# Patient Record
Sex: FEMALE | Race: WHITE | NOT HISPANIC OR LATINO | Employment: FULL TIME | URBAN - METROPOLITAN AREA
[De-identification: names, ages, dates, MRNs, and addresses within clinical notes are randomized per-mention and may not be internally consistent; named-entity substitution may affect disease eponyms.]

---

## 2019-03-22 ENCOUNTER — COMMUNICATION - HEALTHEAST (OUTPATIENT)
Dept: FAMILY MEDICINE | Facility: CLINIC | Age: 24
End: 2019-03-22

## 2021-05-26 NOTE — TELEPHONE ENCOUNTER
Spoke with patient asking if she has established care with a new provider since Dr. Diaz has retired. Informed patient that we three providers here on [address] If she would like to establish care with (Dr. Peña, Dr. Narvaez, Lelia Farah CNP).    Patient stated she has moved to [place], [place] and has established care with a new provider Dr. Sanam Mckeon at [facility].     ADRI/JACKSON

## 2021-08-15 ENCOUNTER — HEALTH MAINTENANCE LETTER (OUTPATIENT)
Age: 26
End: 2021-08-15

## 2021-10-11 ENCOUNTER — HEALTH MAINTENANCE LETTER (OUTPATIENT)
Age: 26
End: 2021-10-11

## 2022-02-07 ASSESSMENT — ANXIETY QUESTIONNAIRES
GAD7 TOTAL SCORE: 1
6. BECOMING EASILY ANNOYED OR IRRITABLE: NOT AT ALL
1. FEELING NERVOUS, ANXIOUS, OR ON EDGE: SEVERAL DAYS
3. WORRYING TOO MUCH ABOUT DIFFERENT THINGS: NOT AT ALL
5. BEING SO RESTLESS THAT IT IS HARD TO SIT STILL: NOT AT ALL
7. FEELING AFRAID AS IF SOMETHING AWFUL MIGHT HAPPEN: NOT AT ALL
GAD7 TOTAL SCORE: 1
4. TROUBLE RELAXING: NOT AT ALL
2. NOT BEING ABLE TO STOP OR CONTROL WORRYING: NOT AT ALL
7. FEELING AFRAID AS IF SOMETHING AWFUL MIGHT HAPPEN: NOT AT ALL

## 2022-02-08 ASSESSMENT — ANXIETY QUESTIONNAIRES: GAD7 TOTAL SCORE: 1

## 2022-02-14 ENCOUNTER — OFFICE VISIT (OUTPATIENT)
Dept: OBGYN | Facility: CLINIC | Age: 27
End: 2022-02-14
Attending: ADVANCED PRACTICE MIDWIFE
Payer: COMMERCIAL

## 2022-02-14 VITALS
BODY MASS INDEX: 27.38 KG/M2 | WEIGHT: 164.3 LBS | SYSTOLIC BLOOD PRESSURE: 132 MMHG | HEART RATE: 91 BPM | HEIGHT: 65 IN | DIASTOLIC BLOOD PRESSURE: 85 MMHG

## 2022-02-14 DIAGNOSIS — Z12.4 SCREENING FOR MALIGNANT NEOPLASM OF CERVIX: ICD-10-CM

## 2022-02-14 DIAGNOSIS — Z00.00 VISIT FOR PREVENTIVE HEALTH EXAMINATION: Primary | ICD-10-CM

## 2022-02-14 DIAGNOSIS — Z13.1 SCREENING FOR DIABETES MELLITUS: ICD-10-CM

## 2022-02-14 DIAGNOSIS — Z30.431 INTRAUTERINE DEVICE SURVEILLANCE: ICD-10-CM

## 2022-02-14 DIAGNOSIS — T83.32XA INTRAUTERINE CONTRACEPTIVE DEVICE THREADS LOST, INITIAL ENCOUNTER: ICD-10-CM

## 2022-02-14 DIAGNOSIS — Z01.419 ENCOUNTER FOR GYNECOLOGICAL EXAMINATION WITHOUT ABNORMAL FINDING: ICD-10-CM

## 2022-02-14 LAB
ALBUMIN SERPL-MCNC: 3.8 G/DL (ref 3.4–5)
ALP SERPL-CCNC: 44 U/L (ref 40–150)
ALT SERPL W P-5'-P-CCNC: 24 U/L (ref 0–50)
ANION GAP SERPL CALCULATED.3IONS-SCNC: 4 MMOL/L (ref 3–14)
AST SERPL W P-5'-P-CCNC: 9 U/L (ref 0–45)
BILIRUB SERPL-MCNC: 0.4 MG/DL (ref 0.2–1.3)
BUN SERPL-MCNC: 15 MG/DL (ref 7–30)
CALCIUM SERPL-MCNC: 9.3 MG/DL (ref 8.5–10.1)
CHLORIDE BLD-SCNC: 106 MMOL/L (ref 94–109)
CO2 SERPL-SCNC: 25 MMOL/L (ref 20–32)
CREAT SERPL-MCNC: 0.58 MG/DL (ref 0.52–1.04)
ERYTHROCYTE [DISTWIDTH] IN BLOOD BY AUTOMATED COUNT: 12.2 % (ref 10–15)
GFR SERPL CREATININE-BSD FRML MDRD: >90 ML/MIN/1.73M2
GLUCOSE BLD-MCNC: 97 MG/DL (ref 70–99)
HBA1C MFR BLD: 5.1 % (ref 0–5.6)
HCT VFR BLD AUTO: 40 % (ref 35–47)
HGB BLD-MCNC: 13.6 G/DL (ref 11.7–15.7)
MCH RBC QN AUTO: 30 PG (ref 26.5–33)
MCHC RBC AUTO-ENTMCNC: 34 G/DL (ref 31.5–36.5)
MCV RBC AUTO: 88 FL (ref 78–100)
PLATELET # BLD AUTO: 246 10E3/UL (ref 150–450)
POTASSIUM BLD-SCNC: 3.9 MMOL/L (ref 3.4–5.3)
PROT SERPL-MCNC: 7.6 G/DL (ref 6.8–8.8)
RBC # BLD AUTO: 4.53 10E6/UL (ref 3.8–5.2)
SODIUM SERPL-SCNC: 135 MMOL/L (ref 133–144)
WBC # BLD AUTO: 5.8 10E3/UL (ref 4–11)

## 2022-02-14 PROCEDURE — G0463 HOSPITAL OUTPT CLINIC VISIT: HCPCS

## 2022-02-14 PROCEDURE — 85027 COMPLETE CBC AUTOMATED: CPT | Performed by: ADVANCED PRACTICE MIDWIFE

## 2022-02-14 PROCEDURE — G0145 SCR C/V CYTO,THINLAYER,RESCR: HCPCS | Performed by: ADVANCED PRACTICE MIDWIFE

## 2022-02-14 PROCEDURE — 99385 PREV VISIT NEW AGE 18-39: CPT | Performed by: ADVANCED PRACTICE MIDWIFE

## 2022-02-14 PROCEDURE — 83036 HEMOGLOBIN GLYCOSYLATED A1C: CPT | Performed by: ADVANCED PRACTICE MIDWIFE

## 2022-02-14 PROCEDURE — 80053 COMPREHEN METABOLIC PANEL: CPT | Performed by: ADVANCED PRACTICE MIDWIFE

## 2022-02-14 PROCEDURE — 36415 COLL VENOUS BLD VENIPUNCTURE: CPT | Performed by: ADVANCED PRACTICE MIDWIFE

## 2022-02-14 ASSESSMENT — ENCOUNTER SYMPTOMS
NECK MASS: 0
HEMATURIA: 0
LIGHT-HEADEDNESS: 0
DEPRESSION: 0
SINUS PAIN: 0
HEADACHES: 0
HALLUCINATIONS: 0
BLOOD IN STOOL: 0
SEIZURES: 0
BACK PAIN: 0
EYE WATERING: 0
SMELL DISTURBANCE: 0
LEG SWELLING: 0
EXERCISE INTOLERANCE: 0
HEARTBURN: 0
LOSS OF CONSCIOUSNESS: 0
LEG PAIN: 0
WEIGHT GAIN: 0
SPEECH CHANGE: 0
WHEEZING: 0
WEAKNESS: 0
NIGHT SWEATS: 0
COUGH DISTURBING SLEEP: 0
MEMORY LOSS: 0
POSTURAL DYSPNEA: 0
JOINT SWELLING: 0
SINUS CONGESTION: 0
NAIL CHANGES: 0
COUGH: 0
BOWEL INCONTINENCE: 0
ABDOMINAL PAIN: 0
BREAST MASS: 0
ALTERED TEMPERATURE REGULATION: 0
EYE PAIN: 0
DIARRHEA: 0
FATIGUE: 0
INSOMNIA: 0
BLOATING: 0
PARALYSIS: 0
TROUBLE SWALLOWING: 0
SHORTNESS OF BREATH: 0
POLYDIPSIA: 0
CONSTIPATION: 0
SORE THROAT: 0
NERVOUS/ANXIOUS: 0
RECTAL BLEEDING: 0
MUSCLE WEAKNESS: 0
TINGLING: 0
SLEEP DISTURBANCES DUE TO BREATHING: 0
VOMITING: 0
MUSCLE CRAMPS: 0
BRUISES/BLEEDS EASILY: 0
DYSURIA: 0
TREMORS: 0
SNORES LOUDLY: 0
SPUTUM PRODUCTION: 0
HOARSE VOICE: 0
FEVER: 0
RESPIRATORY PAIN: 0
HYPERTENSION: 0
HEMOPTYSIS: 0
HYPOTENSION: 0
DIZZINESS: 0
DIFFICULTY URINATING: 0
ORTHOPNEA: 0
EYE IRRITATION: 0
DECREASED APPETITE: 0
SYNCOPE: 0
RECTAL PAIN: 0
HOT FLASHES: 0
MYALGIAS: 0
INCREASED ENERGY: 0
TASTE DISTURBANCE: 0
DISTURBANCES IN COORDINATION: 0
POLYPHAGIA: 0
NUMBNESS: 0
DYSPNEA ON EXERTION: 0
ARTHRALGIAS: 0
EXTREMITY NUMBNESS: 0
WEIGHT LOSS: 0
NECK PAIN: 0
DECREASED LIBIDO: 0
DECREASED CONCENTRATION: 0
JAUNDICE: 0
DOUBLE VISION: 0
POOR WOUND HEALING: 0
PANIC: 0
FLANK PAIN: 0
NAUSEA: 0
STIFFNESS: 0
BREAST PAIN: 0
TACHYCARDIA: 0
EYE REDNESS: 0
SKIN CHANGES: 0
SWOLLEN GLANDS: 0
CHILLS: 0
CLAUDICATION: 0
PALPITATIONS: 0

## 2022-02-14 ASSESSMENT — PAIN SCALES - GENERAL: PAINLEVEL: NO PAIN (0)

## 2022-02-14 ASSESSMENT — MIFFLIN-ST. JEOR: SCORE: 1476.14

## 2022-02-14 NOTE — PROGRESS NOTES
"  Progress Note    SUBJECTIVE:  Vreo Honeycutt is an 26 year old, No obstetric history on file., who requests an Annual Preventive Exam.       Concerns today include: Overall feeling well, no present concerns.     Menstrual History:  Has a IUD (Mirena) placed in 2019, for birth control  Performs string tests \"not often\"  **has not felt strings x 4 mos, prior to that could routinely feel them. Denies pain.  Hx of malpositioned IUD (2nd IUD), felt pain with intercourse, IUD found in cx canal, replaced w current IUD in 2019, no pain since  One male partner, monogamous relationship over last 5 years  Gets a couple days of spotting every few months    No results found for: PAP, last PAP was in [place] about 3 years ago  History of abnormal Pap smear: NO - age 21-29 PAP every 3 years recommended    Last No results found for: HPV16  Last No results found for: HPV18  Last No results found for: HRHPV    Mammogram current: mother had breast cancer at age 35. Discussed early mammograms as an option in the future with shared decision making.    Last Colonoscopy: No risk factors and no screening until 45 per new guidelines.     HISTORY:  calcium carbonate (OS-JUVENTINO 500 MG [place]. [place]) 500 MG tablet, Take 500 mg by mouth 2 times daily.    magnesium 250 MG tablet, Take 1 tablet by mouth daily.    Probiotic Product (SOLUBLE FIBER/PROBIOTICS) CHEW, Take  by mouth.    levonorgestrel (MIRENA) 20 MCG/24HR IUD, 1 each by Intrauterine route    No current facility-administered medications on file prior to visit.    No Known Allergies  Immunization History   Administered Date(s) Administered     COVID-19,PF,Pfizer (12+ Yrs) 2021     MMR 2015     Meningococcal (Menomune ) 10/14/2012     Td,adult,historic,unspecified 2005, 08/15/2011, 2011     Tdap (Adacel,Boostrix) 2016       OB History    Para Term  AB Living   0 0 0 0 0 0   SAB IAB Ectopic Multiple Live Births   0 0 0 0 0     Past Medical History: "   Diagnosis Date     Urinary tract infection I had about three in 2018, but the issue resolved     Past Surgical History:   Procedure Laterality Date     HC REDUCTION OF LARGE BREAST      Description: Breast Surgery Reduction Procedure;  Recorded: 06/16/2014;     MAMMOPLASTY REDUCTION BILATERAL       ORTHOPEDIC SURGERY  ACL reconstruction March 2016     ID HYMENOTOMY, SIMPLE INCISION      Description: Hymenotomy;  Recorded: 06/06/2013;     WISDOM TOOTH EXTRACTION       Family History   Problem Relation Age of Onset     Breast Cancer Mother      Osteoporosis Mother         She has osteopenia     Alcoholism Father      Anxiety Disorder Father      Depression Father      Breast Cancer Maternal Grandmother      Other Cancer Maternal Grandmother         Pancreatic     Cancer Maternal Grandfather         testicular     Prostate Cancer Maternal Grandfather      Breast Cancer Cousin      Asthma Niece      Social History     Socioeconomic History     Marital status: Single     Spouse name: Not on file     Number of children: Not on file     Years of education: Not on file     Highest education level: Not on file   Occupational History     Not on file   Tobacco Use     Smoking status: Never Smoker     Smokeless tobacco: Never Used   Substance and Sexual Activity     Alcohol use: Yes     Alcohol/week: 1.7 standard drinks     Drug use: No     Sexual activity: Yes     Partners: Male     Birth control/protection: I.U.D.   Other Topics Concern     Parent/sibling w/ CABG, MI or angioplasty before 65F 55M? 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   HENT:  Negative for ear pain, hearing loss, tinnitus, nosebleeds, trouble swallowing, hoarse voice, mouth sores, sore throat, ear discharge, tooth pain, gum tenderness, taste disturbance, smell disturbance, hearing aid, bleeding gums, dry mouth, sinus pain, sinus congestion and neck mass.    Eyes:  Negative for double vision, pain, redness, eye pain, decreased vision, eye watering, eye bulging, eye dryness, flashing lights, spots, floaters, strabismus, tunnel vision, jaundice and eye irritation.   Respiratory:   Negative for cough, hemoptysis, sputum production, shortness of breath, wheezing, sleep disturbances due to breathing, snores loudly, respiratory pain, dyspnea on exertion, cough disturbing sleep and postural dyspnea.    Cardiovascular:  Negative for chest pain, dyspnea on exertion, palpitations, orthopnea, claudication, leg swelling, fingers/toes turn blue, hypertension, hypotension, syncope, history of heart murmur, chest pain on exertion, chest pain at rest, pacemaker, few scattered varicosities, leg pain, sleep disturbances due to breathing, tachycardia, light-headedness, exercise intolerance and edema.   Gastrointestinal:  Negative for heartburn, nausea, vomiting, abdominal  pain, diarrhea, constipation, blood in stool, melena, rectal pain, bloating, hemorrhoids, bowel incontinence, jaundice, rectal bleeding, coffee ground emesis and change in stool.   Genitourinary:  Negative for bladder incontinence, dysuria, urgency, hematuria, flank pain, vaginal discharge, difficulty urinating, genital sores, dyspareunia, decreased libido, nocturia, voiding less frequently, arousal difficulty, abnormal vaginal bleeding, excessive menstruation, menstrual changes, hot flashes, vaginal dryness and postmenopausal bleeding.   Musculoskeletal:  Negative for myalgias, back pain, joint swelling, arthralgias, stiffness, muscle cramps, neck pain, bone pain, muscle weakness and fracture.   Skin:  Negative for nail changes, itching, poor wound healing, rash, hair changes, skin changes, acne, warts, poor wound healing, scarring, flaky skin, Raynaud's phenomenon, sensitivity to sunlight and skin thickening.   Neurological:  Negative for dizziness, tingling, tremors, speech change, seizures, loss of consciousness, weakness, light-headedness, numbness, headaches, disturbances in coordination, extremity numbness, memory loss, difficulty walking and paralysis.   Endo/Heme:  Negative for anemia, swollen glands and bruises/bleeds easily.   Psychiatric/Behavioral:  Negative for depression, hallucinations, memory loss, decreased concentration, mood swings and panic attacks.    Breast:  Negative for breast discharge, breast mass, breast pain and nipple retraction.   Endocrine:  Negative for altered temperature regulation, polyphagia, polydipsia, unwanted hair growth and change in facial hair.    Review Of Systems  Skin: negative for, rash, itching, bruising  Eyes: negative for, visual blurring, double vision, eye pain  Ears/Nose/Throat: negative for, nasal congestion, vertigo  Respiratory: No shortness of breath, dyspnea on exertion, cough, or hemoptysis  Cardiovascular: negative, palpitations and chest  "pain  Gastrointestinal: positive for constipation  Genitourinary: negative for, dysuria and hematuria  Musculoskeletal: negative for, back pain and joint pain  Neurologic: negative for and headaches  Psychiatric: negative for, anxiety and depression  Hematologic/Lymphatic/Immunologic: negative for, chills and fever  Endocrine: negative for, cold intolerance, heat intolerance and hot flashes    No flowsheet data found.  KRISTIN-7 SCORE 2/7/2022   Total Score 1 (minimal anxiety)   Total Score 1     PHQ-9 at today's visit was 2 (minimal depression) no intervention needed at this time.    EXAM:  Blood pressure 132/85, pulse 91, height 1.651 m (5' 5\"), weight 74.5 kg (164 lb 4.8 oz). Body mass index is 27.34 kg/m .  General - pleasant female in no acute distress.  Skin - no suspicious lesions or rashes, bilateral well healed scars on breasts.  EENT-  EOMI bilaterally, euthyroid with out palpable nodules  Neck - supple without lymphadenopathy, no thyroid masses to palpation  Lungs - clear to auscultation bilaterally in all posterior lung fileds.  Heart - S1, S2 with regular rate and rhythm without murmur, no murmurs, gallops or rubs. No peripheral edema.   Abdomen - soft, nontender, nondistended, no masses or organomegaly noted.  Musculoskeletal - no gross deformities.  Neurological - normal strength, sensation, and mental status, gait steady.  Mood: Upbeat and engaged.     Breast Exam:  Breast: Without visible skin changes. No dimpling or lesions seen.   Breasts supple, non-tender with palpation, no dominant mass, nodularity, or nipple discharge noted bilaterally. Axillary nodes negative.    bilateral well healed scars on breasts from reduction at age 14.    Pelvic Exam:  EG/BUS: Normal genital architecture without lesions, erythema or abnormal secretions. Normal genital architecture without lesions, erythema or abnormal secretions  Urethral meatus: normal without lesions  Urethra: no masses, tenderness, or " scarring  Bladder: no masses or tenderness   Vagina: moist, pink, rugae with no visible secretions  Cervix: Nulliparous, and pink, moist, closed, without lesion. No palpable IUD, no visible IUD strings.  Uterus: No masses or cervical motion tenderness  Adnexa: Within normal limits and No masses, nodularity, tenderness      ASSESSMENT:  Encounter Diagnoses   Name Primary?     Visit for preventive health examination Yes     Screening for diabetes mellitus      Screening for malignant neoplasm of cervix      Encounter for gynecological examination without abnormal finding      Intrauterine device surveillance      Intrauterine contraceptive device threads lost, initial encounter         PLAN:   Orders Placed This Encounter   Procedures     Pelvic and Breast Exam Procedure []     Pap Smear Exam [] Do Not Remove     US Pelvic Transabdominal and Transvaginal     Hemoglobin A1c [LAB90]     Comprehensive metabolic panel     CBC with Platelets     Orders Placed This Encounter   Medications     levonorgestrel (MIRENA) 20 MCG/24HR IUD     Si each by Intrauterine route     1. Visit for preventive health examination  No results of any recent blood work on file due to her move from [place]. Ordered basic lab work including a CBC, CMP, and hemoglobin A1c for baseline screening. Unable to give a fasting lipid panel today due to having already ate, suggested doing a lipid panel screening at her next visit. Discussed diet, exercise, and sleep habits. Suggested she start taking a vitamin D supplement through the fall and winter months in [place] up to 5,000iu daily.   - Comprehensive metabolic panel; Future  - CBC with Platelets; Future      2. Need for prophylactic vaccination and inoculation against influenza  Needs several immunizations including Hep A, influenza, Hep B, and varicella. Did not get all vaccines when she was younger. Recommended her to get her 2 dose series of Hep A, which we do not offer in clinic and to  check with local pharmacy. The clinic does not have the Influenza vaccine today, directed her to contact her pharmacy to obtain the flu and Hep A. She will consider getting the Hep B vaccine series at a later date.      3. Screening for diabetes mellitus  Not a high risk for diabetes, but no hemoglobin A1c on file. Screen today and educated on nutrition including eating a variety of foods from different food groups and continuing with exercise routine.   - Hemoglobin A1c [LAB90]; Future    4. Screening for malignant neoplasm of cervix  Pap completed in 2019. Per guidelines and patient request pap completed today to screen for cervical cancer. Patient currently uses the IUD as birth control. Discussed using barrier methods for STI protection. In monogamous relationship with one partner over the last 5 years.   - Pap Smear Exam [] Do Not Remove  - Pelvic and Breast Exam Procedure []  - Pap imaged thin layer screen reflex to HPV if ASCUS - recommended age 25 - 29 years    5. Encounter for gynecological examination without abnormal finding  Pap completed, next pap recommendations per ACOG guidelines in 3 years if results are normal.   - Pap Smear Exam [] Do Not Remove  - Pelvic and Breast Exam Procedure []    6. Intrauterine device surveillance  No strings seen during pelvic exam. Ordered US to confirm placement. She would like to contact her insurance company to make sure the US is covered. Will schedule US and come back to complete at a later date.   - US Pelvic Transabdominal and Transvaginal; Future    7. Intrauterine contraceptive device threads lost, initial encounter  Discussed importance of string checks. Will come back for US to confirm placement.   - US Pelvic Transabdominal and Transvaginal; Future    Additional teaching done at this visit regarding self breast exam, exercise, vitamin D supplementation, and birth control.    Return to clinic in one year.  Follow-up as needed.      As a  student nurse practitioner, I participated in the documented services with direct supervision from the provider below.   HAMMAD EspinalN, RN, DNP student  I agree with the PFSH and ROS as completed by the student, except for changes made by me. The remainder of the encounter was performed by me and scribed by the student. The scribed note accurately reflects my personal services and decisions made by me.  Mabel Dumont, JUSTINE, CNM, APRN

## 2022-02-14 NOTE — LETTER
"2/14/2022       RE: Vero Honeycutt  [address] [facility] [address]     Dear Colleague,    Thank you for referring your patient, Vero Honeycutt, to the [facility] [place] at [facility]. Please see a copy of my visit note below.      Progress Note    SUBJECTIVE:  Vero Honeycutt is an 26 year old, No obstetric history on file., who requests an Annual Preventive Exam.       Concerns today include: Overall feeling well, no present concerns.     Menstrual History:  Has a IUD (Mirena) placed in 12/2019, for birth control  Performs string tests \"not often\"  **has not felt strings x 4 mos, prior to that could routinely feel them. Denies pain.  Hx of malpositioned IUD (2nd IUD), felt pain with intercourse, IUD found in cx canal, replaced w current IUD in 2019, no pain since  One male partner, monogamous relationship over last 5 years  Gets a couple days of spotting every few months    No results found for: PAP, last PAP was in [place] about 3 years ago  History of abnormal Pap smear: NO - age 21-29 PAP every 3 years recommended    Last No results found for: HPV16  Last No results found for: HPV18  Last No results found for: HRHPV    Mammogram current: mother had breast cancer at age 35. Discussed early mammograms as an option in the future with shared decision making.    Last Colonoscopy: No risk factors and no screening until 45 per new guidelines.     HISTORY:  calcium carbonate (OS-JUVENTINO 500 MG [place]. [place]) 500 MG tablet, Take 500 mg by mouth 2 times daily.    magnesium 250 MG tablet, Take 1 tablet by mouth daily.    Probiotic Product (SOLUBLE FIBER/PROBIOTICS) CHEW, Take  by mouth.    levonorgestrel (MIRENA) 20 MCG/24HR IUD, 1 each by Intrauterine route    No current facility-administered medications on file prior to visit.    No Known Allergies  Immunization History   Administered Date(s) Administered     COVID-19MIK,Pfizer (12+ Yrs) 12/02/2021 "     MMR 2015     Meningococcal (Menomune ) 10/14/2012     Td,adult,historic,unspecified 2005, 08/15/2011, 2011     Tdap (Adacel,Boostrix) 2016       OB History    Para Term  AB Living   0 0 0 0 0 0   SAB IAB Ectopic Multiple Live Births   0 0 0 0 0     Past Medical History:   Diagnosis Date     Urinary tract infection I had about three in 2018, but the issue resolved     Past Surgical History:   Procedure Laterality Date     HC REDUCTION OF LARGE BREAST      Description: Breast Surgery Reduction Procedure;  Recorded: 2014;     MAMMOPLASTY REDUCTION BILATERAL       ORTHOPEDIC SURGERY  ACL reconstruction 2016     VA HYMENOTOMY, SIMPLE INCISION      Description: Hymenotomy;  Recorded: 2013;     WISDOM TOOTH EXTRACTION       Family History   Problem Relation Age of Onset     Breast Cancer Mother      Osteoporosis Mother         She has osteopenia     Alcoholism Father      Anxiety Disorder Father      Depression Father      Breast Cancer Maternal Grandmother      Other Cancer Maternal Grandmother         Pancreatic     Cancer Maternal Grandfather         testicular     Prostate Cancer Maternal Grandfather      Breast Cancer Cousin      Asthma Niece      Social History     Socioeconomic History     Marital status: Single     Spouse name: Not on file     Number of children: Not on file     Years of education: Not on file     Highest education level: Not on file   Occupational History     Not on file   Tobacco Use     Smoking status: Never Smoker     Smokeless tobacco: Never Used   Substance and Sexual Activity     Alcohol use: Yes     Alcohol/week: 1.7 standard drinks     Drug use: No     Sexual activity: Yes     Partners: Male     Birth control/protection: I.U.D.   Other Topics Concern     Parent/sibling w/ CABG, MI or angioplasty before 65F 55M? 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   HENT:  Negative for ear pain, hearing loss, tinnitus, nosebleeds, trouble swallowing, hoarse voice, mouth sores, sore throat, ear discharge, tooth pain, gum tenderness, taste disturbance, smell disturbance, hearing aid, bleeding gums, dry mouth, sinus pain, sinus congestion and neck mass.    Eyes:  Negative for double vision, pain, redness, eye pain, decreased vision, eye watering, eye bulging, eye dryness, flashing lights, spots, floaters, strabismus, tunnel vision, jaundice and eye irritation.   Respiratory:   Negative for cough, hemoptysis, sputum production, shortness of breath, wheezing, sleep disturbances due to breathing, snores loudly, respiratory pain, dyspnea on exertion, cough disturbing sleep and postural dyspnea.    Cardiovascular:  Negative for chest pain, dyspnea on exertion, palpitations, orthopnea, claudication, leg swelling, fingers/toes turn blue, hypertension,  hypotension, syncope, history of heart murmur, chest pain on exertion, chest pain at rest, pacemaker, few scattered varicosities, leg pain, sleep disturbances due to breathing, tachycardia, light-headedness, exercise intolerance and edema.   Gastrointestinal:  Negative for heartburn, nausea, vomiting, abdominal pain, diarrhea, constipation, blood in stool, melena, rectal pain, bloating, hemorrhoids, bowel incontinence, jaundice, rectal bleeding, coffee ground emesis and change in stool.   Genitourinary:  Negative for bladder incontinence, dysuria, urgency, hematuria, flank pain, vaginal discharge, difficulty urinating, genital sores, dyspareunia, decreased libido, nocturia, voiding less frequently, arousal difficulty, abnormal vaginal bleeding, excessive menstruation, menstrual changes, hot flashes, vaginal dryness and postmenopausal bleeding.   Musculoskeletal:  Negative for myalgias, back pain, joint swelling, arthralgias, stiffness, muscle cramps, neck pain, bone pain, muscle weakness and fracture.   Skin:  Negative for nail changes, itching, poor wound healing, rash, hair changes, skin changes, acne, warts, poor wound healing, scarring, flaky skin, Raynaud's phenomenon, sensitivity to sunlight and skin thickening.   Neurological:  Negative for dizziness, tingling, tremors, speech change, seizures, loss of consciousness, weakness, light-headedness, numbness, headaches, disturbances in coordination, extremity numbness, memory loss, difficulty walking and paralysis.   Endo/Heme:  Negative for anemia, swollen glands and bruises/bleeds easily.   Psychiatric/Behavioral:  Negative for depression, hallucinations, memory loss, decreased concentration, mood swings and panic attacks.    Breast:  Negative for breast discharge, breast mass, breast pain and nipple retraction.   Endocrine:  Negative for altered temperature regulation, polyphagia, polydipsia, unwanted hair growth and change in facial hair.    Review Of  "Systems  Skin: negative for, rash, itching, bruising  Eyes: negative for, visual blurring, double vision, eye pain  Ears/Nose/Throat: negative for, nasal congestion, vertigo  Respiratory: No shortness of breath, dyspnea on exertion, cough, or hemoptysis  Cardiovascular: negative, palpitations and chest pain  Gastrointestinal: positive for constipation  Genitourinary: negative for, dysuria and hematuria  Musculoskeletal: negative for, back pain and joint pain  Neurologic: negative for and headaches  Psychiatric: negative for, anxiety and depression  Hematologic/Lymphatic/Immunologic: negative for, chills and fever  Endocrine: negative for, cold intolerance, heat intolerance and hot flashes    No flowsheet data found.  KRISTIN-7 SCORE 2/7/2022   Total Score 1 (minimal anxiety)   Total Score 1     PHQ-9 at today's visit was 2 (minimal depression) no intervention needed at this time.    EXAM:  Blood pressure 132/85, pulse 91, height 1.651 m (5' 5\"), weight 74.5 kg (164 lb 4.8 oz). Body mass index is 27.34 kg/m .  General - pleasant female in no acute distress.  Skin - no suspicious lesions or rashes, bilateral well healed scars on breasts.  EENT-  EOMI bilaterally, euthyroid with out palpable nodules  Neck - supple without lymphadenopathy, no thyroid masses to palpation  Lungs - clear to auscultation bilaterally in all posterior lung fileds.  Heart - S1, S2 with regular rate and rhythm without murmur, no murmurs, gallops or rubs. No peripheral edema.   Abdomen - soft, nontender, nondistended, no masses or organomegaly noted.  Musculoskeletal - no gross deformities.  Neurological - normal strength, sensation, and mental status, gait steady.  Mood: Upbeat and engaged.     Breast Exam:  Breast: Without visible skin changes. No dimpling or lesions seen.   Breasts supple, non-tender with palpation, no dominant mass, nodularity, or nipple discharge noted bilaterally. Axillary nodes negative.    bilateral well healed scars on " breasts from reduction at age 14.    Pelvic Exam:  EG/BUS: Normal genital architecture without lesions, erythema or abnormal secretions. Normal genital architecture without lesions, erythema or abnormal secretions  Urethral meatus: normal without lesions  Urethra: no masses, tenderness, or scarring  Bladder: no masses or tenderness   Vagina: moist, pink, rugae with no visible secretions  Cervix: Nulliparous, and pink, moist, closed, without lesion. No palpable IUD, no visible IUD strings.  Uterus: No masses or cervical motion tenderness  Adnexa: Within normal limits and No masses, nodularity, tenderness      ASSESSMENT:  Encounter Diagnoses   Name Primary?     Visit for preventive health examination Yes     Screening for diabetes mellitus      Screening for malignant neoplasm of cervix      Encounter for gynecological examination without abnormal finding      Intrauterine device surveillance      Intrauterine contraceptive device threads lost, initial encounter         PLAN:   Orders Placed This Encounter   Procedures     Pelvic and Breast Exam Procedure []     Pap Smear Exam [] Do Not Remove     US Pelvic Transabdominal and Transvaginal     Hemoglobin A1c [LAB90]     Comprehensive metabolic panel     CBC with Platelets     Orders Placed This Encounter   Medications     levonorgestrel (MIRENA) 20 MCG/24HR IUD     Si each by Intrauterine route     1. Visit for preventive health examination  No results of any recent blood work on file due to her move from [place]. Ordered basic lab work including a CBC, CMP, and hemoglobin A1c for baseline screening. Unable to give a fasting lipid panel today due to having already ate, suggested doing a lipid panel screening at her next visit. Discussed diet, exercise, and sleep habits. Suggested she start taking a vitamin D supplement through the fall and winter months in [place] up to 5,000iu daily.   - Comprehensive metabolic panel; Future  - CBC with Platelets;  Future      2. Need for prophylactic vaccination and inoculation against influenza  Needs several immunizations including Hep A, influenza, Hep B, and varicella. Did not get all vaccines when she was younger. Recommended her to get her 2 dose series of Hep A, which we do not offer in clinic and to check with local pharmacy. The clinic does not have the Influenza vaccine today, directed her to contact her pharmacy to obtain the flu and Hep A. She will consider getting the Hep B vaccine series at a later date.      3. Screening for diabetes mellitus  Not a high risk for diabetes, but no hemoglobin A1c on file. Screen today and educated on nutrition including eating a variety of foods from different food groups and continuing with exercise routine.   - Hemoglobin A1c [LAB90]; Future    4. Screening for malignant neoplasm of cervix  Pap completed in 2019. Per guidelines and patient request pap completed today to screen for cervical cancer. Patient currently uses the IUD as birth control. Discussed using barrier methods for STI protection. In monogamous relationship with one partner over the last 5 years.   - Pap Smear Exam [] Do Not Remove  - Pelvic and Breast Exam Procedure []  - Pap imaged thin layer screen reflex to HPV if ASCUS - recommended age 25 - 29 years    5. Encounter for gynecological examination without abnormal finding  Pap completed, next pap recommendations per ACOG guidelines in 3 years if results are normal.   - Pap Smear Exam [] Do Not Remove  - Pelvic and Breast Exam Procedure []    6. Intrauterine device surveillance  No strings seen during pelvic exam. Ordered US to confirm placement. She would like to contact her insurance company to make sure the US is covered. Will schedule US and come back to complete at a later date.   - US Pelvic Transabdominal and Transvaginal; Future    7. Intrauterine contraceptive device threads lost, initial encounter  Discussed importance of string  checks. Will come back for US to confirm placement.   - US Pelvic Transabdominal and Transvaginal; Future    Additional teaching done at this visit regarding self breast exam, exercise, vitamin D supplementation, and birth control.    Return to clinic in one year.  Follow-up as needed.      As a student nurse practitioner, I participated in the documented services with direct supervision from the provider below.   Dolores Orosco, HAMMADN, RN, DNP student  I agree with the PFSH and ROS as completed by the student, except for changes made by me. The remainder of the encounter was performed by me and scribed by the student. The scribed note accurately reflects my personal services and decisions made by me.  Mabel Dumont, JUSTINE, CNM, APRN

## 2022-02-14 NOTE — PATIENT INSTRUCTIONS
Annual screening  Checked for anemia, electrolytes, kidney and liver functioning, and an average blood sugar over the last 3 months. Next year it would be good for you to get your lipids checked, this should be done fasting.     PREVENTIVE HEALTH RECOMMENDATIONS:   Most women need a yearly breast and pelvic exam.    A PAP screen, a test done DURING a pelvic exam, is NO longer recommended yearly.    March 2013, screening guidelines recommended by ACOG for PAP screen are:    1) First pap at age 21.    2) Pap every 3 years until age 30.    3) After age 30, pap every 3 years or Pap with HR HPV screen every 5 years until age 65.  4) Women do NOT need a vaginal Pap screen after a hysterectomy (surgical removal of the uterus) when they have not had cancer.    Exceptions:  1) Yearly pap if HIV+ or immunosuppressed secondary to organ transplant  2) CATHY II-III continue routine screening for 20 years.    I encourage you continue looking for opportunities to choose a healthy lifestyle:       * Choose to eat a heart healthy diet. Check out the FOOD PLATE guidelines at: http://www.chooseTailwindplate.gov/ for helpful hints on weight and cholesterol management.  Balance your caloric intake with exercise to maintain a BMI in the 22 to 26 range. For bone health: Eat calcium-rich foods like yogurt, broccoli or take chewable calcium pills (500 to 600 mg) twice a day with food.       * Exercise for at least an average of 30 minutes a day, 5 days of the week. This will help you control your weight, release stress, and help prevent disease.      * Take a Vitamin D3 supplement daily fall through spring and during summer unless you [id number]' full body sun exposure to skin without sunscreen., 2,000-4,000iu daily     * DO wear sunscreen to prevent skin cancer after the first 15-30 minutes.      * Identify stressors in your life, find ways to release the stress, and, make time for yourself. PLEASE ask for help if mood changes last longer than two  weeks.     * Limit alcohol to one drink per day.  No smoking.  Avoid second hand smoke. If you smoke, ask for help to stop.       *  If you are in a sexual relationship, talk with your partner about possible infection risks and take action to protect yourself from exposure to a sexual infection.    Please request an infection screen for STIs (sexually transmitted infections) if you are less than age 26 OR believe that you may be at risk.     Get a flu shot each year. Get a tetanus shot every 10 years. EVERYONE needs a pertussis (Whooping cough) booster.    See your dentist twice a year for an exam and preventive care cleaning.     Consider the following screen tests:    1) cholesterol test every 5 years.     2) yearly mammogram after age 40 unless you have identified risks.    3) colonoscopy every 10 years after age 50 unless you have identified risks.    4) diabetes blood test screening if you are at risk for diabetes.      Additional information that you may also find helpful:  The Internet now gives us access to LOTS of information -- some of it helpful, research documented and also plenty of harmful, anecdotal information that may not pertain to your situtaion. Consider visiting the following websites for accurate health information:    www.vitamindcouncil.org/ : Info and ongoing research re Vitamin D    www.fairview.org : Up to date and easily searchable information on multiple topics.    www.medlineplus.gov : medication info, interactive tutorials, watch real surgeries online    www.cdc.gov : public health info, travel advisories, epidemics (H1N1)    www.reid/std.org: current research re diagnosis, treatment and prevention of sexually contacted infections.    www.health.[facility].[place] : [place] dept of heatlh, public health issues in [place], N1N1    www.familydoctor.org : good info from the Academy of Family Physicians

## 2022-02-16 LAB
BKR LAB AP GYN ADEQUACY: NORMAL
BKR LAB AP GYN INTERPRETATION: NORMAL
BKR LAB AP HPV REFLEX: NORMAL
BKR LAB AP PREVIOUS ABNORMAL: NORMAL
PATH REPORT.COMMENTS IMP SPEC: NORMAL
PATH REPORT.COMMENTS IMP SPEC: NORMAL
PATH REPORT.RELEVANT HX SPEC: NORMAL

## 2022-09-25 ENCOUNTER — HEALTH MAINTENANCE LETTER (OUTPATIENT)
Age: 27
End: 2022-09-25

## 2023-01-11 ENCOUNTER — ANCILLARY PROCEDURE (OUTPATIENT)
Dept: ULTRASOUND IMAGING | Facility: CLINIC | Age: 28
End: 2023-01-11
Attending: ADVANCED PRACTICE MIDWIFE
Payer: COMMERCIAL

## 2023-01-11 DIAGNOSIS — T83.32XA INTRAUTERINE CONTRACEPTIVE DEVICE THREADS LOST, INITIAL ENCOUNTER: ICD-10-CM

## 2023-01-11 DIAGNOSIS — Z30.431 INTRAUTERINE DEVICE SURVEILLANCE: ICD-10-CM

## 2023-01-11 PROCEDURE — 76830 TRANSVAGINAL US NON-OB: CPT

## 2023-01-11 PROCEDURE — 76830 TRANSVAGINAL US NON-OB: CPT | Mod: 26 | Performed by: OBSTETRICS & GYNECOLOGY

## 2023-02-14 ASSESSMENT — ANXIETY QUESTIONNAIRES
7. FEELING AFRAID AS IF SOMETHING AWFUL MIGHT HAPPEN: NOT AT ALL
3. WORRYING TOO MUCH ABOUT DIFFERENT THINGS: NOT AT ALL
1. FEELING NERVOUS, ANXIOUS, OR ON EDGE: NOT AT ALL
6. BECOMING EASILY ANNOYED OR IRRITABLE: NOT AT ALL
GAD7 TOTAL SCORE: 0
2. NOT BEING ABLE TO STOP OR CONTROL WORRYING: NOT AT ALL
7. FEELING AFRAID AS IF SOMETHING AWFUL MIGHT HAPPEN: NOT AT ALL
GAD7 TOTAL SCORE: 0
GAD7 TOTAL SCORE: 0
5. BEING SO RESTLESS THAT IT IS HARD TO SIT STILL: NOT AT ALL
4. TROUBLE RELAXING: NOT AT ALL

## 2023-02-14 ASSESSMENT — ENCOUNTER SYMPTOMS
HOARSE VOICE: 0
SINUS CONGESTION: 1
TROUBLE SWALLOWING: 0
NECK MASS: 0
SMELL DISTURBANCE: 0
SINUS PAIN: 0
TASTE DISTURBANCE: 0
SORE THROAT: 0

## 2023-02-16 ENCOUNTER — OFFICE VISIT (OUTPATIENT)
Dept: OBGYN | Facility: CLINIC | Age: 28
End: 2023-02-16
Attending: ADVANCED PRACTICE MIDWIFE
Payer: COMMERCIAL

## 2023-02-16 VITALS
SYSTOLIC BLOOD PRESSURE: 132 MMHG | HEIGHT: 65 IN | WEIGHT: 177 LBS | HEART RATE: 83 BPM | BODY MASS INDEX: 29.49 KG/M2 | DIASTOLIC BLOOD PRESSURE: 84 MMHG

## 2023-02-16 DIAGNOSIS — Z00.00 ANNUAL PHYSICAL EXAM: Primary | ICD-10-CM

## 2023-02-16 DIAGNOSIS — Z80.3 FAMILY HISTORY OF MALIGNANT NEOPLASM OF BREAST: ICD-10-CM

## 2023-02-16 DIAGNOSIS — Z97.5 IUD (INTRAUTERINE DEVICE) IN PLACE: ICD-10-CM

## 2023-02-16 PROCEDURE — 99395 PREV VISIT EST AGE 18-39: CPT | Performed by: ADVANCED PRACTICE MIDWIFE

## 2023-02-16 PROCEDURE — 99213 OFFICE O/P EST LOW 20 MIN: CPT | Performed by: ADVANCED PRACTICE MIDWIFE

## 2023-02-16 ASSESSMENT — ENCOUNTER SYMPTOMS
POOR WOUND HEALING: 0
DECREASED CONCENTRATION: 0
MUSCLE CRAMPS: 0
NUMBNESS: 0
DISTURBANCES IN COORDINATION: 0
HYPOTENSION: 0
EYE IRRITATION: 0
BREAST MASS: 0
SYNCOPE: 0
EYE PAIN: 0
PARALYSIS: 0
WHEEZING: 0
DIFFICULTY URINATING: 0
CLAUDICATION: 0
ABDOMINAL PAIN: 0
SORE THROAT: 0
VOMITING: 0
DECREASED LIBIDO: 0
WEIGHT LOSS: 0
SPUTUM PRODUCTION: 0
HYPERTENSION: 0
BREAST PAIN: 0
INSOMNIA: 0
NAIL CHANGES: 0
TROUBLE SWALLOWING: 0
HEMATURIA: 0
HEADACHES: 0
SWOLLEN GLANDS: 0
DECREASED APPETITE: 0
COUGH: 0
BOWEL INCONTINENCE: 0
HEMOPTYSIS: 0
PANIC: 0
LIGHT-HEADEDNESS: 0
POSTURAL DYSPNEA: 0
TACHYCARDIA: 0
COUGH DISTURBING SLEEP: 0
DIARRHEA: 0
PALPITATIONS: 0
ARTHRALGIAS: 0
NAUSEA: 0
LEG PAIN: 0
NERVOUS/ANXIOUS: 0
HEARTBURN: 0
MEMORY LOSS: 0
BLOOD IN STOOL: 0
BACK PAIN: 0
HOARSE VOICE: 0
DIZZINESS: 0
NECK MASS: 0
DEPRESSION: 0
RECTAL BLEEDING: 0
ALTERED TEMPERATURE REGULATION: 0
SPEECH CHANGE: 0
CHILLS: 0
MUSCLE WEAKNESS: 0
FLANK PAIN: 0
DOUBLE VISION: 0
SHORTNESS OF BREATH: 0
BLOATING: 0
SLEEP DISTURBANCES DUE TO BREATHING: 0
DYSURIA: 0
RECTAL PAIN: 0
JOINT SWELLING: 0
FATIGUE: 0
EXTREMITY NUMBNESS: 0
SINUS PAIN: 0
SKIN CHANGES: 0
LEG SWELLING: 0
LOSS OF CONSCIOUSNESS: 0
TREMORS: 0
CONSTIPATION: 0
FEVER: 0
DYSPNEA ON EXERTION: 0
BRUISES/BLEEDS EASILY: 0
ORTHOPNEA: 0
EXERCISE INTOLERANCE: 0
WEAKNESS: 0
NECK PAIN: 0
POLYDIPSIA: 0
WEIGHT GAIN: 0
POLYPHAGIA: 0
SMELL DISTURBANCE: 0
SEIZURES: 0
NIGHT SWEATS: 0
STIFFNESS: 0
RESPIRATORY PAIN: 0
HOT FLASHES: 0
TINGLING: 0
MYALGIAS: 0
EYE WATERING: 0
INCREASED ENERGY: 0
SNORES LOUDLY: 0
HALLUCINATIONS: 0
SINUS CONGESTION: 1
JAUNDICE: 0
TASTE DISTURBANCE: 0
EYE REDNESS: 0

## 2023-02-16 ASSESSMENT — PAIN SCALES - GENERAL: PAINLEVEL: NO PAIN (0)

## 2023-02-16 NOTE — LETTER
2023       RE: Vero Honeycutt   [address]  [facility] [address]     Dear Colleague,    Thank you for referring your patient, Vero Honeycutt, to the [facility] [place] at [facility]. Please see a copy of my visit note below.    Progress Note    SUBJECTIVE:  Vero Honeycutt is an 27 year old, , who requests an Annual Preventive Exam.   Concerns today include: Occasional wintertime nose bleeds and would like a dermatology and genetic screening referral.     Menstrual History:  Menstrual History 2022   LAST MENSTRUAL PERIOD - 1/15/2023 -   Menarche Age 12 - 13.5   Period Cycle (Days) random with iud - random periods on iud   Period Duration (Days) 2 days - 1-2   Method of Contraception Mirena IUD - Mirena IUD   Period Pattern Irregular - Irregular   Menstrual Flow Light - Light   Dysmenorrhea Mild - Mild   PMS Symptoms Cramping;Diarrhea - Cramping;Mood Changes   Reviewed Today Yes - Yes   Comments breast tenderness - -       Last  No results found for: PAP  History of abnormal Pap smear: NO - age 21-29 PAP every 3 years recommended; due     Mammogram current: Family hx of breast cancer, will see genetic counselor to determine screening plan.    Last Colonoscopy: NA      HISTORY:  Prescription Medications as of 2023       Rx Number Disp Refills Start End Last Dispensed Date Next Fill Date Owning Pharmacy    levonorgestrel (MIRENA) 20 MCG/24HR IUD    2018        Si each by Intrauterine route    Class: Historical    Route: Intrauterine        No Known Allergies  Immunization History   Administered Date(s) Administered     COVID-19 Vaccine 12+ (Pfizer) 2021     COVID-19 Vaccine Bivalent Booster 12+ (Pfizer) 2022     HPV9 2018, 2018     Influenza Vaccine >6 months (Alfuria,Fluzone) 2022     MMR 2015     Meningococcal (Menomune ) 10/14/2012     TD  (ADULT, 7+) 08/15/2011     Td (Adult), Adsorbed 2005, 2011     Td,adult,historic,unspecified 2005, 08/15/2011, 2011     Tdap (Adacel,Boostrix) 2016       OB History    Para Term  AB Living   0 0 0 0 0 0   SAB IAB Ectopic Multiple Live Births   0 0 0 0 0     Past Medical History:   Diagnosis Date     Urinary tract infection I had about three in 2018, but the issue resolved     Past Surgical History:   Procedure Laterality Date     HC REDUCTION OF LARGE BREAST      Description: Breast Surgery Reduction Procedure;  Recorded: 2014;     MAMMOPLASTY REDUCTION BILATERAL       ORTHOPEDIC SURGERY  ACL reconstruction 2016     IN HYMENOTOMY, SIMPLE INCISION      Description: Hymenotomy;  Recorded: 2013;     WISDOM TOOTH EXTRACTION       Family History   Problem Relation Age of Onset     Breast Cancer Mother      Osteoporosis Mother         She has osteopenia     Alcoholism Father      Anxiety Disorder Father      Depression Father      Breast Cancer Maternal Grandmother      Other Cancer Maternal Grandmother         Pancreatic     Cancer Maternal Grandfather         testicular     Prostate Cancer Maternal Grandfather      Asthma Niece      Breast Cancer Cousin      Social History     Socioeconomic History     Marital status: Single   Tobacco Use     Smoking status: Never     Smokeless tobacco: Never   Vaping Use     Vaping Use: Never used   Substance and Sexual Activity     Alcohol use: Yes     Alcohol/week: 1.7 standard drinks     Drug use: No     Sexual activity: Yes     Partners: Male     Birth control/protection: I.U.D.   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   HENT:  Positive for nosebleeds and sinus congestion. Negative for ear pain, hearing loss, tinnitus, trouble swallowing, hoarse voice, mouth sores, sore throat, ear discharge, tooth pain, gum tenderness, taste disturbance, smell disturbance, hearing aid, bleeding gums, dry mouth, sinus pain and neck mass.    Eyes:  Negative for double vision, pain, redness, eye pain, decreased vision, eye watering, eye bulging, eye dryness, flashing lights, spots, floaters, strabismus, tunnel vision, jaundice and eye irritation.   Respiratory:   Negative for cough, hemoptysis, sputum production, shortness of breath, wheezing, sleep disturbances due to breathing, snores loudly, respiratory pain, dyspnea on exertion, cough disturbing sleep and postural dyspnea.    Cardiovascular:  Negative for chest pain, dyspnea on exertion, palpitations, orthopnea, claudication, leg swelling, fingers/toes turn blue, hypertension, hypotension, syncope, history of heart murmur, chest pain on exertion, chest pain at rest, pacemaker, few scattered varicosities, leg pain, sleep disturbances due to breathing, tachycardia, light-headedness, exercise intolerance and edema.   Gastrointestinal:  Negative for heartburn, nausea, vomiting, abdominal pain, diarrhea, constipation, blood in stool, melena, rectal pain, bloating, hemorrhoids, bowel incontinence, jaundice, rectal bleeding, coffee ground emesis and change in stool.   Genitourinary:  Negative for bladder incontinence, dysuria, urgency, hematuria, flank pain, vaginal discharge, difficulty urinating, genital sores, dyspareunia, decreased libido, nocturia, voiding less frequently, arousal difficulty, abnormal vaginal bleeding, excessive menstruation, menstrual changes, hot flashes, vaginal dryness and postmenopausal bleeding.   Musculoskeletal:  Negative for myalgias, back pain, joint swelling,  "arthralgias, stiffness, muscle cramps, neck pain, bone pain, muscle weakness and fracture.   Skin:  Negative for nail changes, itching, poor wound healing, rash, hair changes, skin changes, acne, warts, poor wound healing, scarring, flaky skin, Raynaud's phenomenon, sensitivity to sunlight and skin thickening.   Neurological:  Negative for dizziness, tingling, tremors, speech change, seizures, loss of consciousness, weakness, light-headedness, numbness, headaches, disturbances in coordination, extremity numbness, memory loss, difficulty walking and paralysis.   Endo/Heme:  Negative for anemia, swollen glands and bruises/bleeds easily.   Psychiatric/Behavioral:  Negative for depression, hallucinations, memory loss, decreased concentration, mood swings and panic attacks.    Breast:  Negative for breast discharge, breast mass, breast pain and nipple retraction.   Endocrine:  Negative for altered temperature regulation, polyphagia, polydipsia, unwanted hair growth and change in facial hair.        EXAM:  Blood pressure 132/84, pulse 83, height 1.651 m (5' 5\"), weight 80.3 kg (177 lb), last menstrual period 01/15/2023. Body mass index is 29.45 kg/m .  General - pleasant female in no acute distress.  Skin - no suspicious lesions or rashes  EENT-  PERRLA, euthyroid with out palpable nodules  Neck - supple without lymphadenopathy.  Lungs - clear to auscultation bilaterally.  Heart - regular rate and rhythm without murmur.  Abdomen - soft, nontender, nondistended, no masses or organomegaly noted.  Musculoskeletal - no gross deformities.  Neurological - normal strength, sensation, and mental status.    Breast Exam:  Breast: Without visible skin changes. No dimpling or lesions seen.   Breasts supple, non-tender with palpation, no dominant mass, nodularity, or nipple discharge noted bilaterally. Axillary nodes negative. Scars from breast reduciton noted.      Pelvic Exam:  Deferred    ASSESSMENT:  Encounter Diagnoses   Name " Primary?     Annual physical exam Yes     Family history of malignant neoplasm of breast      IUD (intrauterine device) in place           PLAN:   Orders Placed This Encounter   Procedures     Adult Dermatology Referral     Cancer Risk Mgmt/Cancer Genetic Counseling Referral        Additional teaching done at this visit regarding birth control; Mirena approved for 5 years for bleeding and 8 years for contraception.   Discussed saline nasal spray for loosening dried mucus to avoid nose bleeds as well as a moisturizing nasal spray for when her nasal mucosa feels dry.   Referrals placed for genetic counseling d/t family history of breast cancer at an early age, as well as for dermatology per patient request.     Return to clinic in one year.  Follow-up as needed.    I, Savanah Mckay, completed the PFSH and ROS. I then acted as a scribe for CNM for the remainder of the visit. - Savanah Mckay, RN SNM    I agree with the PFSH and ROS as completed by the SNM, except for changes made by me. The remainder of the encounter was performed by me and scribed by the SNM. The scribed note accurately reflects my personal services and decisions made by me.        Answers for HPI/ROS submitted by the patient on 2/14/2023  KRISTIN 7 TOTAL SCORE: 0  I agree with the PFSH and ROS as completed by the student, except for changes made by me. The remainder of the encounter was performed by me and scribed by the student. The scribed note accurately reflects my personal services and decisions made by me.  Mabel Dumont, JUSTINE, CNM, APRN

## 2023-02-16 NOTE — PROGRESS NOTES
Progress Note    SUBJECTIVE:  Vero Honeycutt is an 27 year old, , who requests an Annual Preventive Exam.   Concerns today include: Occasional wintertime nose bleeds and would like a dermatology and genetic screening referral.     Menstrual History:  Menstrual History 2022   LAST MENSTRUAL PERIOD - 1/15/2023 -   Menarche Age 12 - 13.5   Period Cycle (Days) random with iud - random periods on iud   Period Duration (Days) 2 days - 1-2   Method of Contraception Mirena IUD - Mirena IUD   Period Pattern Irregular - Irregular   Menstrual Flow Light - Light   Dysmenorrhea Mild - Mild   PMS Symptoms Cramping;Diarrhea - Cramping;Mood Changes   Reviewed Today Yes - Yes   Comments breast tenderness - -       Last  No results found for: PAP  History of abnormal Pap smear: NO - age 21-29 PAP every 3 years recommended; due     Mammogram current: Family hx of breast cancer, will see genetic counselor to determine screening plan.    Last Colonoscopy: NA      HISTORY:  Prescription Medications as of 2023       Rx Number Disp Refills Start End Last Dispensed Date Next Fill Date Owning Pharmacy    levonorgestrel (MIRENA) 20 MCG/24HR IUD    2018        Si each by Intrauterine route    Class: Historical    Route: Intrauterine        No Known Allergies  Immunization History   Administered Date(s) Administered     COVID-19 Vaccine 12+ (Pfizer) 2021     COVID-19 Vaccine Bivalent Booster 12+ (Pfizer) 2022     HPV9 2018, 2018     Influenza Vaccine >6 months (Alfuria,Fluzone) 2022     MMR 2015     Meningococcal (Menomune ) 10/14/2012     TD (ADULT, 7+) 08/15/2011     Td (Adult), Adsorbed 2005, 2011     Td,adult,historic,unspecified 2005, 08/15/2011, 2011     Tdap (Adacel,Boostrix) 2016       OB History    Para Term  AB Living   0 0 0 0 0 0   SAB IAB Ectopic Multiple Live Births   0 0 0 0 0     Past Medical History:    Diagnosis Date     Urinary tract infection I had about three in 2018, but the issue resolved     Past Surgical History:   Procedure Laterality Date     HC REDUCTION OF LARGE BREAST      Description: Breast Surgery Reduction Procedure;  Recorded: 06/16/2014;     MAMMOPLASTY REDUCTION BILATERAL       ORTHOPEDIC SURGERY  ACL reconstruction March 2016     RI HYMENOTOMY, SIMPLE INCISION      Description: Hymenotomy;  Recorded: 06/06/2013;     WISDOM TOOTH EXTRACTION       Family History   Problem Relation Age of Onset     Breast Cancer Mother      Osteoporosis Mother         She has osteopenia     Alcoholism Father      Anxiety Disorder Father      Depression Father      Breast Cancer Maternal Grandmother      Other Cancer Maternal Grandmother         Pancreatic     Cancer Maternal Grandfather         testicular     Prostate Cancer Maternal Grandfather      Asthma Niece      Breast Cancer Cousin      Social History     Socioeconomic History     Marital status: Single   Tobacco Use     Smoking status: Never     Smokeless tobacco: Never   Vaping Use     Vaping Use: Never used   Substance and Sexual Activity     Alcohol use: Yes     Alcohol/week: 1.7 standard drinks     Drug use: No     Sexual activity: Yes     Partners: Male     Birth control/protection: I.U.D.   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   HENT:  Positive for nosebleeds and sinus congestion. Negative for ear pain, hearing loss, tinnitus, trouble swallowing, hoarse voice, mouth sores, sore throat, ear discharge, tooth pain, gum tenderness, taste disturbance,  smell disturbance, hearing aid, bleeding gums, dry mouth, sinus pain and neck mass.    Eyes:  Negative for double vision, pain, redness, eye pain, decreased vision, eye watering, eye bulging, eye dryness, flashing lights, spots, floaters, strabismus, tunnel vision, jaundice and eye irritation.   Respiratory:   Negative for cough, hemoptysis, sputum production, shortness of breath, wheezing, sleep disturbances due to breathing, snores loudly, respiratory pain, dyspnea on exertion, cough disturbing sleep and postural dyspnea.    Cardiovascular:  Negative for chest pain, dyspnea on exertion, palpitations, orthopnea, claudication, leg swelling, fingers/toes turn blue, hypertension, hypotension, syncope, history of heart murmur, chest pain on exertion, chest pain at rest, pacemaker, few scattered varicosities, leg pain, sleep disturbances due to breathing, tachycardia, light-headedness, exercise intolerance and edema.   Gastrointestinal:  Negative for heartburn, nausea, vomiting, abdominal pain, diarrhea, constipation, blood in stool, melena, rectal pain, bloating, hemorrhoids, bowel incontinence, jaundice, rectal bleeding, coffee ground emesis and change in stool.   Genitourinary:  Negative for bladder incontinence, dysuria, urgency, hematuria, flank pain, vaginal discharge, difficulty urinating, genital sores, dyspareunia, decreased libido, nocturia, voiding less frequently, arousal difficulty, abnormal vaginal bleeding, excessive menstruation, menstrual changes, hot flashes, vaginal dryness and postmenopausal bleeding.   Musculoskeletal:  Negative for myalgias, back pain, joint swelling, arthralgias, stiffness, muscle cramps, neck pain, bone pain, muscle weakness and fracture.   Skin:  Negative for nail changes, itching, poor wound healing, rash, hair changes, skin changes, acne, warts, poor wound healing, scarring, flaky skin, Raynaud's phenomenon, sensitivity to sunlight and skin thickening.   Neurological:   "Negative for dizziness, tingling, tremors, speech change, seizures, loss of consciousness, weakness, light-headedness, numbness, headaches, disturbances in coordination, extremity numbness, memory loss, difficulty walking and paralysis.   Endo/Heme:  Negative for anemia, swollen glands and bruises/bleeds easily.   Psychiatric/Behavioral:  Negative for depression, hallucinations, memory loss, decreased concentration, mood swings and panic attacks.    Breast:  Negative for breast discharge, breast mass, breast pain and nipple retraction.   Endocrine:  Negative for altered temperature regulation, polyphagia, polydipsia, unwanted hair growth and change in facial hair.        EXAM:  Blood pressure 132/84, pulse 83, height 1.651 m (5' 5\"), weight 80.3 kg (177 lb), last menstrual period 01/15/2023. Body mass index is 29.45 kg/m .  General - pleasant female in no acute distress.  Skin - no suspicious lesions or rashes  EENT-  PERRLA, euthyroid with out palpable nodules  Neck - supple without lymphadenopathy.  Lungs - clear to auscultation bilaterally.  Heart - regular rate and rhythm without murmur.  Abdomen - soft, nontender, nondistended, no masses or organomegaly noted.  Musculoskeletal - no gross deformities.  Neurological - normal strength, sensation, and mental status.    Breast Exam:  Breast: Without visible skin changes. No dimpling or lesions seen.   Breasts supple, non-tender with palpation, no dominant mass, nodularity, or nipple discharge noted bilaterally. Axillary nodes negative. Scars from breast reduciton noted.      Pelvic Exam:  Deferred    ASSESSMENT:  Encounter Diagnoses   Name Primary?     Annual physical exam Yes     Family history of malignant neoplasm of breast      IUD (intrauterine device) in place           PLAN:   Orders Placed This Encounter   Procedures     Adult Dermatology Referral     Cancer Risk Mgmt/Cancer Genetic Counseling Referral        Additional teaching done at this visit regarding " birth control; Mirena approved for 5 years for bleeding and 8 years for contraception.   Discussed saline nasal spray for loosening dried mucus to avoid nose bleeds as well as a moisturizing nasal spray for when her nasal mucosa feels dry.   Referrals placed for genetic counseling d/t family history of breast cancer at an early age, as well as for dermatology per patient request.     Return to clinic in one year.  Follow-up as needed.    I, Savanah Mckay, completed the PFSH and ROS. I then acted as a scribe for CNM for the remainder of the visit. - Savanah Mckay, RN SNM    I agree with the PFSH and ROS as completed by the SNM, except for changes made by me. The remainder of the encounter was performed by me and scribed by the SNM. The scribed note accurately reflects my personal services and decisions made by me.        Answers for HPI/ROS submitted by the patient on 2/14/2023  KRISTIN 7 TOTAL SCORE: 0  I agree with the PFSH and ROS as completed by the student, except for changes made by me. The remainder of the encounter was performed by me and scribed by the student. The scribed note accurately reflects my personal services and decisions made by me.  Mabel Dumont, JUSTINE, CNM, APRN

## 2023-05-16 NOTE — PROGRESS NOTES
Virtual Visit Details    Type of service:  Video Visit     Originating Location (pt. Location): Home  Distant Location (provider location):  Off-site  Platform used for Video Visit: Frances   Time spent over video: 45 minutes    5/17/2023    Referring Provider: Mabel Dumont APRN CNM    Presenting Information:   I spoke with Vero Honeycutt over video today for genetic counseling to discuss her family history of cancer. This appointment was conducted virtually due to COVID-19 precautions. We talked today to review this history, cancer screening recommendations, and available genetic testing options.    Personal History:  Vero is a 27 year old female. She does not have any personal history of cancer.     She had her first menstrual period at age 12 and is premenopausal. She does not have any children. Vero has her ovaries, fallopian tubes and uterus in place. She reports that she has not used hormone replacement therapy. She has a Mirena IUD. She has not yet had any breast imaging. She had a breast reduction in 2014. Vero has not had a colonoscopy due to her age. She reports that she is scheduled for her first dermatology appointment for a skin exam this summer. Vero reported no history of tobacco use and alcohol use of 4 drinks per week.    Family History: (Please see scanned pedigree for detailed family history information)  Maternal:    Her mother is 75 years old and was diagnosed with breast cancer at age 40. Treatment included mastectomy and some chemotherapy and radiation. She has had 2-5 colon polyps. Vero reports that she had genetic testing for the BRCA1 and BRCA2 genes only around the time of her diagnosis. She does not think she has ever had updated testing.    Her grandmother was diagnosed with breast cancer at age 76 and then with pancreatic cancer at age 79. She passed away at age 80. She may have had a history of some social smoking, but was not a significant or lifelong smoker.     Her  grandfather was diagnosed with prostate cancer at age 90 and passed away at age 103.    He had a sister (Vero's great-aunt) who had lung cancer. She was a lifelong smoker.   Paternal:    Her father is 83 years old with no known history of cancer. He has reportedly had negative BRCA1/2 testing about 5 years ago due to a familial BRCA2 mutation in his brother.    She has four uncles and one aunt (all in their 70s-early 80s), all with no known history of cancer. He 76 year old uncle has reportedly tested positive for a BRCA2 mutation.     His daughter (Vero's cousin) is 46 years old and was diagnosed with breast cancer at age 38. She has also reportedly tested positive for the BRCA2 mutation. Vero believes that she has had a bilateral mastectomy and also had her uterus and ovaries removed, although she is not certain about these surgeries.     She is not aware of any other cancers or genetic testing in her paternal relatives.     Her maternal ethnicity is [place], [place]. Her paternal ethnicity is [place]ese, [place]ian. There is no known Ashkenazi Advent ancestry on either side of her family.     Discussion:    Vero's family history of cancer is suggestive of a hereditary cancer syndrome.    We reviewed the features of sporadic, familial, and hereditary cancers. In looking at Vero's family history, it is possible that a cancer susceptibility gene is present due to her maternal family history of breast, pancreatic, and prostate cancer, including young breast cancer in her mother. She also reports a history of young breast cancer in her paternal cousin and a BRCA2 mutation, although her father has reportedly tested negative for the familial mutation.     We discussed the natural history and genetics of hereditary cancer. Based on her maternal family history, we discussed genes associated with an increased risk for breast, pancreatic, and prostate cancer and general screening recommendations. A detailed handout  regarding genes in which mutations are associated with an increased risk for breast cancer and the information we discussed will be provided to Vero via [employer] and can be found in the after visit summary. Topics included: inheritance pattern, cancer risks, cancer screening recommendations, and also risks, benefits and limitations of testing.    We spent some time discussing the most informative approach to genetic testing. In families suspicious for a hereditary cancer syndrome, it is always most informative to begin testing with a family member who has a history of cancer. When we begin testing in someone who has not had cancer, a positive result (detected gene mutation) would be informative; however, a negative result (no gene mutation detected) would be uninformative. Uninformative results provide little new information about cancer risks. The most informative candidate for genetic testing in Vero's family is her mother who had young breast cancer. Vero reported that she has had negative BRCA1/2 testing in the past, although this was a number of years ago and she has not had any known updated testing. We discussed that there are additional genes now known to cause increased risk for breast, pancreatic, prostate, and other cancers. Vero will talk with her mother about the option of updated genetic testing. She would like to proceed with her own testing today, as she is not certain that her mother will want to pursue this. She stated that she understands the limitations in interpreting a negative result for herself in the absence of testing her family members.    Based on her personal and family history, Vero meets current National Comprehensive Cancer Network (NCCN) criteria for genetic testing of high-penetrance breast cancer susceptibility genes, specifically, BRCA1, BRCA2, CDH1, PALB2, PTEN, and TP53.      As many of these genes present with overlapping features in a family and accurate cancer risk cannot  always be established based upon the pedigree analysis alone, it would be reasonable for Vero to consider panel genetic testing to analyze multiple genes at once.    We reviewed genetic testing options for Vero based on her personal and family history: a panel of genes associated with an increased risk for certain cancers, or larger panel options to include genes associated with increased risk for multiple different cancer types. She expressed an interest in more broad testing and opted for the CancerNext Panel.  Genetic testing is available for 36 genes associated with hereditary cancer: CancerNext (APC, REYNA, AXIN2, BARD1, BMPR1A, BRCA1, BRCA2, BRIP1, CDH1, CDK4, CDKN2A, CHEK2, DICER1, EPCAM, GREM1, HOXB13, MLH1, MSH2, MSH3, MSH6, MUTYH, NBN, NF1, NTHL1, PALB2, PMS2, POLD1, POLE, PTEN, RAD51C, RAD51D, RECQL, SMAD4, SMARCA4, STK11, and TP53).  We discussed that many of the genes in the CancerNext panel are associated with specific hereditary cancer syndromes and published management guidelines: Hereditary Breast and Ovarian Cancer syndrome (BRCA1, BRCA2), Avitia syndrome (MLH1, MSH2, MSH6, PMS2, EPCAM), Familial Adenomatous Polyposis (APC), Hereditary Diffuse Gastric Cancer (CDH1), Familial Atypical Multiple Mole Melanoma syndrome (CDK4, CDKN2A), Juvenile Polyposis syndrome (BMPR1A, SMAD4), Cowden syndrome (PTEN), Li Fraumeni syndrome (TP53), Peutz-Jeghers syndrome (STK11), MUTYH Associated Polyposis (MUTYH), and Neurofibromatosis type 1 (NF1).   The REYNA, AXIN2, BRIP1, CHEK2, GREM1, MSH3, NBN, NTHL1, PALB2, POLD1, POLE, RAD51C, and RAD51D genes are associated with increased cancer risk and have published management guidelines for certain cancers.    The remaining genes (BARD1, DICER1, HOXB13, RECQL, and SMARCA4) are associated with increased cancer risk and may allow us to make medical recommendations when mutations are identified.      Due to COVID-19 precautions consent was obtained over the phone/video today.  Genetic testing via the CancerNext Panel will be sent to [facility]. Vero opted to schedule a blood draw for testing. Turnaround time from date when sample is received at the lab: approximately 3-4 weeks.    Medical Management: For Vero, we reviewed that the information from genetic testing may determine:    additional cancer screening for which Vero may qualify (i.e. mammogram and breast MRI, more frequent colonoscopies, more frequent dermatologic exams, etc.),    options for risk reducing surgeries Vero could consider (i.e. bilateral mastectomy, surgery to remove her ovaries and/or uterus, etc.),      and targeted chemotherapies if she were to develop certain cancers in the future (i.e. immunotherapy for individuals with Avitia syndrome, PARP inhibitors, etc.).     These recommendations will be discussed in detail once genetic testing is completed.     Plan:  1) Today Vero elected to proceed with genetic testing via the CancerNext Panel offered by [facility].  2) This information should be available in 4-5 weeks.  3) Vero will be scheduled for a virtual visit (phone or video) to discuss the results.    Paige Mitchell MS, [facility] [place]  Licensed, Certified Genetic Counselor  Office: [phone]  Email: [email or web address]

## 2023-05-17 ENCOUNTER — VIRTUAL VISIT (OUTPATIENT)
Dept: ONCOLOGY | Facility: CLINIC | Age: 28
End: 2023-05-17
Attending: ADVANCED PRACTICE MIDWIFE
Payer: COMMERCIAL

## 2023-05-17 DIAGNOSIS — Z80.3 FAMILY HISTORY OF MALIGNANT NEOPLASM OF BREAST: Primary | ICD-10-CM

## 2023-05-17 DIAGNOSIS — Z80.0 FAMILY HISTORY OF PANCREATIC CANCER: ICD-10-CM

## 2023-05-17 DIAGNOSIS — Z80.42 FAMILY HISTORY OF PROSTATE CANCER: ICD-10-CM

## 2023-05-17 PROCEDURE — 96040 HC GENETIC COUNSELING, EACH 30 MINUTES: CPT | Mod: GT,95 | Performed by: GENETIC COUNSELOR, MS

## 2023-05-17 NOTE — NURSING NOTE
Is the patient currently in the state of [place]? YES    Visit mode:VIDEO    If the visit is dropped, the patient can be reconnected by: VIDEO VISIT: Send to e-mail at: [email or web address]    Will anyone else be joining the visit? NO      How would you like to obtain your AVS? MyChart    Are changes needed to the allergy or medication list? NO    Reason for visit: Video Visit (New  pt)    Sera CALDWELL

## 2023-05-17 NOTE — LETTER
5/17/2023         RE: Vero Honeycutt  [address]  [place] [address]        Dear Colleague,    Thank you for referring your patient, Vero Honeycutt, to the [facility]. Please see a copy of my visit note below.    Virtual Visit Details    Type of service:  Video Visit     Originating Location (pt. Location): Home  Distant Location (provider location):  Off-site  Platform used for Video Visit: Frances   Time spent over video: 45 minutes    5/17/2023    Referring Provider: Mabel Dumont APRN CNM    Presenting Information:   I spoke with Vero Honeycutt over video today for genetic counseling to discuss her family history of cancer. This appointment was conducted virtually due to COVID-19 precautions. We talked today to review this history, cancer screening recommendations, and available genetic testing options.    Personal History:  Vero is a 27 year old female. She does not have any personal history of cancer.     She had her first menstrual period at age 12 and is premenopausal. She does not have any children. Vero has her ovaries, fallopian tubes and uterus in place. She reports that she has not used hormone replacement therapy. She has a Mirena IUD. She has not yet had any breast imaging. She had a breast reduction in 2014. Vero has not had a colonoscopy due to her age. She reports that she is scheduled for her first dermatology appointment for a skin exam this summer. Vero reported no history of tobacco use and alcohol use of 4 drinks per week.    Family History: (Please see scanned pedigree for detailed family history information)  Maternal:  Her mother is 75 years old and was diagnosed with breast cancer at age 40. Treatment included mastectomy and some chemotherapy and radiation. She has had 2-5 colon polyps. Vero reports that she had genetic testing for the BRCA1 and BRCA2 genes only around the time of her diagnosis. She does not think she has ever had  updated testing.  Her grandmother was diagnosed with breast cancer at age 76 and then with pancreatic cancer at age 79. She passed away at age 80. She may have had a history of some social smoking, but was not a significant or lifelong smoker.   Her grandfather was diagnosed with prostate cancer at age 90 and passed away at age 103.  He had a sister (Vero's great-aunt) who had lung cancer. She was a lifelong smoker.   Paternal:  Her father is 83 years old with no known history of cancer. He has reportedly had negative BRCA1/2 testing about 5 years ago due to a familial BRCA2 mutation in his brother.  She has four uncles and one aunt (all in their 70s-early 80s), all with no known history of cancer. He 76 year old uncle has reportedly tested positive for a BRCA2 mutation.   His daughter (Vero's cousin) is 46 years old and was diagnosed with breast cancer at age 38. She has also reportedly tested positive for the BRCA2 mutation. Vero believes that she has had a bilateral mastectomy and also had her uterus and ovaries removed, although she is not certain about these surgeries.   She is not aware of any other cancers or genetic testing in her paternal relatives.     Her maternal ethnicity is [place], [place]. Her paternal ethnicity is [place], [place]. There is no known Ashkenazi Restorationist ancestry on either side of her family.     Discussion:  Vero's family history of cancer is suggestive of a hereditary cancer syndrome.  We reviewed the features of sporadic, familial, and hereditary cancers. In looking at Vero's family history, it is possible that a cancer susceptibility gene is present due to her maternal family history of breast, pancreatic, and prostate cancer, including young breast cancer in her mother. She also reports a history of young breast cancer in her paternal cousin and a BRCA2 mutation, although her father has reportedly tested negative for the familial mutation.   We discussed the natural  history and genetics of hereditary cancer. Based on her maternal family history, we discussed genes associated with an increased risk for breast, pancreatic, and prostate cancer and general screening recommendations. A detailed handout regarding genes in which mutations are associated with an increased risk for breast cancer and the information we discussed will be provided to Vero via [employer] and can be found in the after visit summary. Topics included: inheritance pattern, cancer risks, cancer screening recommendations, and also risks, benefits and limitations of testing.  We spent some time discussing the most informative approach to genetic testing. In families suspicious for a hereditary cancer syndrome, it is always most informative to begin testing with a family member who has a history of cancer. When we begin testing in someone who has not had cancer, a positive result (detected gene mutation) would be informative; however, a negative result (no gene mutation detected) would be uninformative. Uninformative results provide little new information about cancer risks. The most informative candidate for genetic testing in Vero's family is her mother who had young breast cancer. Vero reported that she has had negative BRCA1/2 testing in the past, although this was a number of years ago and she has not had any known updated testing. We discussed that there are additional genes now known to cause increased risk for breast, pancreatic, prostate, and other cancers. Vero will talk with her mother about the option of updated genetic testing. She would like to proceed with her own testing today, as she is not certain that her mother will want to pursue this. She stated that she understands the limitations in interpreting a negative result for herself in the absence of testing her family members.  Based on her personal and family history, Vero meets current National Comprehensive Cancer Network (NCCN) criteria for  genetic testing of high-penetrance breast cancer susceptibility genes, specifically, BRCA1, BRCA2, CDH1, PALB2, PTEN, and TP53.    As many of these genes present with overlapping features in a family and accurate cancer risk cannot always be established based upon the pedigree analysis alone, it would be reasonable for Vero to consider panel genetic testing to analyze multiple genes at once.  We reviewed genetic testing options for Vero based on her personal and family history: a panel of genes associated with an increased risk for certain cancers, or larger panel options to include genes associated with increased risk for multiple different cancer types. She expressed an interest in more broad testing and opted for the CancerNext Panel.  Genetic testing is available for 36 genes associated with hereditary cancer: CancerNext (APC, REYNA, AXIN2, BARD1, BMPR1A, BRCA1, BRCA2, BRIP1, CDH1, CDK4, CDKN2A, CHEK2, DICER1, EPCAM, GREM1, HOXB13, MLH1, MSH2, MSH3, MSH6, MUTYH, NBN, NF1, NTHL1, PALB2, PMS2, POLD1, POLE, PTEN, RAD51C, RAD51D, RECQL, SMAD4, SMARCA4, STK11, and TP53).  We discussed that many of the genes in the CancerNext panel are associated with specific hereditary cancer syndromes and published management guidelines: Hereditary Breast and Ovarian Cancer syndrome (BRCA1, BRCA2), Avitia syndrome (MLH1, MSH2, MSH6, PMS2, EPCAM), Familial Adenomatous Polyposis (APC), Hereditary Diffuse Gastric Cancer (CDH1), Familial Atypical Multiple Mole Melanoma syndrome (CDK4, CDKN2A), Juvenile Polyposis syndrome (BMPR1A, SMAD4), Cowden syndrome (PTEN), Li Fraumeni syndrome (TP53), Peutz-Jeghers syndrome (STK11), MUTYH Associated Polyposis (MUTYH), and Neurofibromatosis type 1 (NF1).   The REYNA, AXIN2, BRIP1, CHEK2, GREM1, MSH3, NBN, NTHL1, PALB2, POLD1, POLE, RAD51C, and RAD51D genes are associated with increased cancer risk and have published management guidelines for certain cancers.    The remaining genes (BARD1, DICER1,  HOXB13, RECQL, and SMARCA4) are associated with increased cancer risk and may allow us to make medical recommendations when mutations are identified.    Due to COVID-19 precautions consent was obtained over the phone/video today. Genetic testing via the CancerNext Panel will be sent to [facility]. Vero opted to schedule a blood draw for testing. Turnaround time from date when sample is received at the lab: approximately 3-4 weeks.  Medical Management: For Vero, we reviewed that the information from genetic testing may determine:  additional cancer screening for which Vero may qualify (i.e. mammogram and breast MRI, more frequent colonoscopies, more frequent dermatologic exams, etc.),  options for risk reducing surgeries Vero could consider (i.e. bilateral mastectomy, surgery to remove her ovaries and/or uterus, etc.),    and targeted chemotherapies if she were to develop certain cancers in the future (i.e. immunotherapy for individuals with Avitia syndrome, PARP inhibitors, etc.).   These recommendations will be discussed in detail once genetic testing is completed.     Plan:  1) Today Vero elected to proceed with genetic testing via the CancerNext Panel offered by [facility].  2) This information should be available in 4-5 weeks.  3) Vero will be scheduled for a virtual visit (phone or video) to discuss the results.    Paige Mitchell MS, [facility]  Licensed, Certified Genetic Counselor  Office: [phone]  Email: [email or web address]

## 2023-05-18 NOTE — PATIENT INSTRUCTIONS
Assessing Cancer Risk  Cancer is a common diagnosis which impacts many families.  Individuals may develop cancer due to environmental factors (such as exposures and lifestyle), aging, genetic predisposition, or a combination of these factors.      Only about 5-10% of cancers are thought to be due to an inherited cancer susceptibility gene.    These families often have:  Several people with the same or related types of cancer  Cancers diagnosed at a young age (before age 50)  Individuals with more than one primary cancer  Multiple generations of the family affected with cancer    Comprehensive Breast and Gynecologic Cancer Panel  We each inherit two copies of every gene in our bodies: one from our mother, and one from our father. Each gene has a specific job to do.  When a gene has a mistake or  mutation  in it, it does not work like it should.     Some people may be candidates for genetic testing of more than one gene.  For these families, genetic testing using a cancer panel may be offered. These panels will test different genes at once known to increase the risk for breast, ovarian, uterine, and/or other cancers.    This handout will review common hereditary breast and gynecologic cancer syndromes. The genes that will be discussed in this handout are: REYNA, BRCA1, BRCA2, BRIP1, CDH1, CHEK2, MLH1, MSH2, MSH6, PMS2, EPCAM, PTEN, PALB2, RAD51C, RAD51D, and TP53.    The purpose of this handout is to serve as a brief summary of the breast and gynecologic cancer risk genes that have published clinical management guidelines for individuals who are found to carry a mutation. Inheriting a mutation does not mean a person will develop cancer, but it does significantly increase their risk above the general population risk.     ______________________________________________________________________________    Hereditary Breast and Ovarian Cancer Syndrome (BRCA1 and BRCA2)  A single mutation in one of the copies of BRCA1 or  BRCA2 increases the risk for breast and ovarian cancer, among others.  The risk for pancreatic cancer and melanoma may also be slightly increased in some families.  The chart below shows the chance that someone with a BRCA mutation would develop cancer in his or her lifetime1,2,3,4.       Lifetime Cancer Risks    General Population BRCA1  BRCA2   Breast  12% >60% >60%   Ovarian  1-2% 39-58% 13-29%   Prostate 12% 7-26% 19-61%   Male Breast 0.1% 0.2-1.2% 1.8-7.1%   Pancreas 1-2% Up to 5% 5-10%     A person s ethnic background is also important to consider, as individuals of Ashkenazi W[employer] ancestry have a higher chance of having a BRCA gene mutation.  There are three BRCA mutations that occur more frequently in this population.      Avitia Syndrome (MLH1, MSH2, MSH6, PMS2, and EPCAM)  Currently five genes are known to cause Avitia Syndrome: MLH1, MSH2, MSH6, PMS2, and EPCAM.  A single mutation in one of the Avitia Syndrome genes increases the risk for colon, endometrial, ovarian, and stomach cancers.  Other cancers that occur less commonly in Avitia Syndrome include urinary tract, skin, and brain cancers.  The chart below shows the chance that a person with Avitia syndrome would develop cancer in his or her lifetime5.      Lifetime Cancer Risks    General Population Avitia Syndrome   Colon 5% 10-61%   Endometrial 3% 13-57%   Ovarian 1-2% 1-38%   Stomach <1% 1-9%   *Cancer risk varies depending on Avitia syndrome gene found      Cowden Syndrome (PTEN)  Cowden syndrome is a hereditary condition that increases the risk for breast, thyroid, endometrial, colon, and kidney cancer.  Cowden syndrome is caused by a mutation in the PTEN gene.  A single mutation in one of the copies of PTEN causes Cowden syndrome and increases cancer risk.  The chart below shows the chance that someone with a PTEN mutation would develop cancer in their lifetime6,7.  Other benign features seen in some individuals with Cowden syndrome include benign  skin lesions (facial papules, keratoses, lipomas), learning disability, autism, thyroid nodules, colon polyps, and larger head size.     Lifetime Cancer Risks    General Population Cowden   Breast 12% 40-60%*   Thyroid 1% Up to 38%   Renal 1-2% Up to 35%   Endometrial 3% Up to 28%   Colon 5% Up to 9%   Melanoma 2-3% Up to 6%   *Emerging data suggests the risk for breast cancer could be greater than 60%               Li-Fraumeni Syndrome (TP53)  Li-Fraumeni Syndrome (LFS) is a cancer predisposition syndrome caused by a mutation in the TP53 gene. A single mutation in one of the copies of TP53 increases the risk for multiple cancers. Individuals with LFS are at an increased risk for developing cancer at a young age. The lifetime risk for development of a LFS-associated cancer is 50% by age 30 and 90% by age 60.   Core Cancers: Sarcomas, Breast, Brain, Lung, Leukemias/Lymphomas, Adrenocortical carcinomas  Other Cancers: Gastrointestinal, Thyroid, Skin, Genitourinary       Hereditary Diffuse Gastric Cancer (CDH1)  Currently, one gene is known to cause hereditary diffuse gastric cancer (HDGC): CDH1.  Individuals with HDGC are at increased risk for diffuse gastric cancer and lobular breast cancer. Of people diagnosed with HDGC, 30-50% have a mutation in the CDH1 gene.  This suggests there are likely other genes that may cause HDGC that have not been identified yet.      Lifetime Cancer Risks    General Population HDGC   Diffuse Gastric  <1% ~80%   Breast 12% 41-60%       Additional Genes    REYNA  REYNA is a moderate-risk breast cancer gene. Women who have a mutation in REYNA can have between a 2-4 fold increased risk for breast cancer compared to the general population8. REYNA mutations have also been associated with increased risk for pancreatic cancer between 5-10%9. Individuals who inherit two REYNA mutations have a condition called ataxia-telangiectasia (AT).  This rare autosomal recessive condition affects the nervous system  and immune system, and is associated with progressive cerebellar ataxia beginning in childhood. Individuals with ataxia-telangiectasia often have a weakened immune system and have an increased risk for childhood cancers.    PALB2  Mutations in PALB2 have been shown to increase the risk of breast cancer up to 41-60% in some families; where individuals fall within this risk range is dependent upon family [id number]. PALB2 mutations have also been associated with increased risk for pancreatic cancer between 5-10%.  Individuals who inherit two PALB2 mutations--one from their mother and one from their father--have a condition called Fanconi Anemia.  This rare autosomal recessive condition is associated with short stature, developmental delay, bone marrow failure, and increased risk for childhood cancers.    CHEK2   CHEK2 is a moderate-risk breast cancer gene.  Women who have a mutation in CHEK2 have around a 2-4 fold increased risk for breast cancer compared to the general population, and this risk may be higher depending upon family history.11,12,13 The risk of colon cancer may be twice as high as the general population risk of colon cancer of 5%. Mutations in CHEK2 have also been shown to increase the risk of other cancers, including prostate, however these cancer risks are currently not well understood.    BRIP1, RAD51C and RAD51D  Mutations in RAD51C and RAD51D have been shown to increase the risk of ovarian cancer and breast cancer 14,. Mutations in BRIP1 have been shown to increase the risk of ovarian cancer and possibly female breast cancer 15 .       Lifetime Cancer Risk    General Population        BRIP1   RAD51C  RAD51D   Breast 12% Not well defined 20-40% 20-40%   Ovarian 1-2% 5-15% 10-15% 10-20%     ______________________________________________________________  Inheritance  All of the cancer syndromes reviewed above are inherited in an autosomal dominant pattern.  This means that if a parent has a mutation,  each of their children will have a 50% chance of inheriting that same mutation. Therefore, each child --male or female-- would have a 50% chance of being at increased risk for developing cancer.    Image obtained from Genetics Home Reference, 2013     Mutations in some genes can occur de nelia, which means that a person s mutation occurred for the first time in them and was not inherited from a parent.  Now that they have the mutation, however, it can be passed on to future generations.    Genetic Testing  Genetic testing involves a blood test and will look for any harmful mutations that are associated with increased cancer risk.  If possible, it is recommended that the person(s) who has had cancer be tested before other family members.  That person will give us the most useful information about whether or not a specific gene is associated with the cancer in the family.    Results  There are three possible results of genetic testing:  Positive--a harmful mutation was identified in one or more of the genes  Negative--no mutations were identified in any of the genes tested  Variant of unknown significance--a variation in one of the genes was identified, but it is unclear how this impacts cancer risk in the family    Advantages and Disadvantages   There are advantages and disadvantages to genetic testing.    Advantages  May clarify your cancer risk  Can help you make medical decisions  May explain the cancers in your family  May give useful information to your family members (if you share your results)    Disadvantages  Possible negative emotional impact of learning about inherited cancer risk  Uncertainty in interpreting a negative test result in some situations  Possible genetic discrimination concerns (see below)    Genetic Information Nondiscrimination Act (LIBBY)  The Genetic Information Nondiscrimination Act of 2008 (LIBBY) is a federal law that protects individuals from health insurance or employment discrimination  based on a genetic test result alone (with some exceptions, including employers with fewer than 15 employees, and ).  Although rare, LIBBY  does not cover discrimination protections in terms of life insurance, long term care, or disability insurances.  Visit the National Human [employer] [place] website to learn more.    Reducing Cancer Risk  All of the genes described in this handout have nationally recognized cancer screening guidelines that would be recommended for individuals who test positive.  In addition to increased cancer screening, surgeries may be offered or recommended to reduce cancer risk.  Recommendations are based upon an individual s genetic test result as well as their personal and family history of cancer.    Questions to Think About Regarding Genetic Testing:  What effect will the test result have on me and my relationship with my family members if I have an inherited gene mutation?  If I don t have a gene mutation?  Should I share my test results, and how will my family react to this news, which may also affect them?  Are my children ready to learn new information that may one day affect their own health?    Hereditary Cancer Resources    FORCE: Facing Our Risk of Cancer Empowered [email or web address]   Bright Pink bebrightpink.org   Li-Fraumeni Syndrome Association lfsassociation.org   PTEN World PTENworld.com   No stomach for cancer, Inc. nostomachforcancer.org   Stomach cancer relief network Scrnet.org   Collaborative Group of the Americas on Inherited Colorectal Cancer (CGA) cgaicc.com    Cancer Care cancercare.org   American Cancer Society (ACS) cancer.org   National Cancer [place] (NCI) cancer.gov     Please call us if you have any questions or concerns.   Cancer Risk Management Program [phone] ([phone])  Sherif Larson, MS [facility] [place]  [phone]  Luann Crowe, MS, [facility] [phone]  Lilibeth Peterson, MS, [facility]  [phone]  Erica Anderson, MS, [facility]  [phone]  Paige Mitchell,  MS, [facility] [place]  [phone]  Unique Foster, MS, [facility] [phone]  Birdie Nichols, MS, [facility] [phone]    References  Gato Christianson PDP, Lissa S, Real JESUS, Vimal JE, Elma JL, Leonardo N, Pan H, Alyssa O, Jacek A, Pasini B, Radicarlitos P, Manharleen S, Kee DM, Renner N, Kang E, Natasha H, Orosco E, Salma J, Gronbryan J, Maday B, Tulinius H, Thorlacius S, Eerola H, Nevanlinna H, Anita K, Miya OP. Average risks of breast and ovarian cancer associated with BRCA1 or BRCA2 mutations detected in case series unselected for family history: a combined analysis of 222 studies. Am J Hum Jayla. 2003;72:1117-30.  Christina N, Catalina M, Vickie G.  BRCA1 and BRCA2 Hereditary Breast and Ovarian Cancer. Gene Reviews online. 2013.  Guillermo YC, Lior S, Seth G, Redd S. Breast cancer risk among male BRCA1 and BRCA2 mutation carriers. J Natl Cancer Inst. 2007;99:1811-4.  Peterson GONZALEZ, Herlinda I, Mark J, Meghan E, Hero ER, Claudine F. Risk of breast cancer in male BRCA2 carriers. J Med Jayla. 2010;47:710-1.  National Comprehensive Cancer Network. Clinical practice guidelines in oncology, colorectal cancer screening. Available online (registration required). 2015.  Gavin MH, Rashida J, Deacon J, Landry MENESES, Ling MS, Eng C. Lifetime cancer risks in individuals with germline PTEN mutations. Clin Cancer Res. 2012;18:400-7.  Sofia R. Cowden Syndrome: A Critical Review of the Clinical Literature. J Jayla . 2009:18:13-27.  Ale AGUILAR, Raman MONSON, Linnette S, Anisa P, Bettye T, Candelario M, Moustapha B, Gayla H, Shahab R, Carlo K, Grace L, Peterson GONZALEZ, Kee MONSON, Joaquin DF, Karen MR, The Breast Cancer Susceptibility Collaboration (UK) & Rosa FERRERA. REYNA mutations that cause ataxia-telangiectasia are breast cancer susceptibility alleles. Nature Genetics. 2006;38:873-875  Guilherme N , Blair Y, Lissette J, Tyler L, Linda RECIO , Nicole ML, Mayra S, Chary AG, Mary S, Eriberto ML, Benja J , Salvador R, Karma YIN, Yuki  JR, Nelson VE, Nadia M, Vocarolannstein B, Andrew N, Scot RH, Blessing KW, and Lilli AP. REYNA mutations in patients with hereditary pancreatic cancer. Cancer Discover. 2012;2:41-46  Ruth MARIA., et al. Breast-Cancer Risk in Families with Mutations in PALB2. NEJM. 2014; 371(6):497-506.  CHEK2 Breast Cancer Case-Control Consortium. CHEK2*1100delC and susceptibility to breast cancer: A collaborative analysis involving 10,860 breast cancer cases and 9,065 controls from 10 studies. Am J Hum Jayla, 74 (2004), pp. 6509-0522  Gian T, Leeroy S, Paulina K, et al. Spectrum of Mutations in BRCA1, BRCA2, CHEK2, and TP53 in Families at High Risk of Breast Cancer. RAFAELA. 2006;295(12):3778-8161.   Gilmar C, Tejal D, Giuseppe AGUILAR, et al. Risk of breast cancer in women with a CHEK2 mutation with and without a family history of breast cancer. J Clin Oncol. 2011;29:9468-6357.  Song H, Trips E, Ramus SJ, et al. Contribution of germline mutations in the RAD51B, RAD51C, and RAD51D genes to ovarian cancer in the population. J Clin Oncol. 2015;33(26):6423-0604. Doi:10.1200/JCO.2015.61.2408.  Augustus T, Brii DF, Stu P, et al. Mutations in BRIP1 confer high risk of ovarian cancer. Ale Jayla. 2011;43(11):0567-8078. doi:10.1038/ng.955.

## 2023-05-23 ENCOUNTER — LAB (OUTPATIENT)
Dept: LAB | Facility: CLINIC | Age: 28
End: 2023-05-23
Payer: COMMERCIAL

## 2023-05-23 DIAGNOSIS — Z80.3 FAMILY HISTORY OF MALIGNANT NEOPLASM OF BREAST: ICD-10-CM

## 2023-05-23 DIAGNOSIS — Z80.0 FAMILY HISTORY OF PANCREATIC CANCER: ICD-10-CM

## 2023-05-23 DIAGNOSIS — Z80.42 FAMILY HISTORY OF PROSTATE CANCER: ICD-10-CM

## 2023-05-23 PROCEDURE — 36415 COLL VENOUS BLD VENIPUNCTURE: CPT

## 2023-05-23 PROCEDURE — 99000 SPECIMEN HANDLING OFFICE-LAB: CPT

## 2023-06-09 LAB — SCANNED LAB RESULT: NORMAL

## 2023-06-28 ENCOUNTER — VIRTUAL VISIT (OUTPATIENT)
Dept: ONCOLOGY | Facility: CLINIC | Age: 28
End: 2023-06-28
Attending: GENETIC COUNSELOR, MS
Payer: COMMERCIAL

## 2023-06-28 DIAGNOSIS — Z80.0 FAMILY HISTORY OF PANCREATIC CANCER: ICD-10-CM

## 2023-06-28 DIAGNOSIS — Z80.42 FAMILY HISTORY OF PROSTATE CANCER: ICD-10-CM

## 2023-06-28 DIAGNOSIS — Z80.3 FAMILY HISTORY OF MALIGNANT NEOPLASM OF BREAST: Primary | ICD-10-CM

## 2023-06-28 PROCEDURE — 999N000069 HC STATISTIC GENETIC COUNSELING, < 16 MIN: Mod: GT,95 | Performed by: GENETIC COUNSELOR, MS

## 2023-06-28 NOTE — LETTER
"    6/28/2023         RE: Vero Honeycutt  [address]  [place] [address]        Dear Colleague,    Thank you for referring your patient, Vero Honeycutt, to the [facility]. Please see a copy of my visit note below.    Virtual Visit Details    Type of service:  Video Visit     Originating Location (pt. Location): Home  Distant Location (provider location):  Off-site  Platform used for Video Visit: [facility]   Time spent over video: 12 minutes    6/28/2023    Referring Provider: Mabel Dumont APRN CNM    Presenting Information:  I spoke to Vero over video today to discuss her genetic testing results. Her blood was drawn on 5/23/23. The CancerNext Panel was ordered from [facility]. This testing was done because of Vero's family history of cancer.    Genetic Testing Result: NEGATIVE  Vero is negative for mutations in the 36 genes analyzed: APC, REYNA, BARD1, BMPR1A, BRCA1, BRCA2, BRIP1, CDH1, CDK4, CDKN2A, CHEK2, DICER1, MLH1, MSH2, MSH6, MUTYH, NBN, NF1, NTHL1, PALB2, PMS2, PTEN, RAD51C, RAD51D, RECQL, SMAD4, SMARCA4, STK11 and TP53 (sequencing and deletion/duplication); AXIN2, HOXB13, MSH3, POLD1 and POLE (sequencing only); EPCAM and GREM1 (deletion/duplication only).     A copy of the test report can be found in the Laboratory tab, dated 5/23/23, and named \"LABORATORY MISCELLANEOUS ORDER\". The report is scanned in as a linked document.    Interpretation:  We discussed several different interpretations of this negative test result.    One explanation may be that there is a different gene or combination of genes and environment that are associated with the cancers in this family.  It is possible that her relatives have a mutation in one of these genes and she did not inherit it.  There is also a small possibility that there is a mutation in one of these genes, and the testing laboratory could not find it with their current testing methods.       Screening:  Based on this " negative test result, it is important for Vero and her relatives to refer back to the family history for appropriate cancer screening.    Breast cancer screening is generally recommended to begin approximately 10 years younger than the earliest age of breast cancer diagnosis in the family, or at age 40, whichever comes first. Based on her mother's diagnosis of breast cancer at age 40, screening for Vero may begin at age 30. She will be turning 28 in August. Based on the personal and family history information she provided, Vero has an estimated 26.3% lifetime risk of developing breast cancer based on the PATRICIO Risk Evaluation v8 model. As such, Vero meets current National Comprehensive Cancer Network (NCCN) guidelines for high risk breast screening. This includes annual breast MRI in addition to annual mammogram, alternating every 6 months. In addition, Vero should be receiving clinical breast exams by her physician. We discussed that Vero could participate in our Cancer Risk Management Program in which our nursing specialist provides an individual screening plan and assists with medical management. A referral was made to see BERNARDO Alexandre for this service.  Other population cancer screening options, such as those recommended by the American Cancer Society and the National Comprehensive Cancer Network (NCCN), are also appropriate for Vero and her family. These screening recommendations may change if there are changes to Vero's personal and/or family history of cancer. Final screening recommendations should be made by each individual's primary care provider.       Inheritance:  We reviewed the autosomal dominant inheritance of mutations in these genes. We discussed that Vero cannot pass on an identifiable mutation in these genes to any future children based on this test result. Mutations in these genes do not skip generations.      Additional Testing Considerations:  Although Vero's genetic testing  result was negative, other relatives may still carry a gene mutation associated with an increased risk for cancer. Genetic counseling is recommended for her mother and maternal relatives to discuss genetic testing options. There is also a reported BRCA2 mutation in her paternal relatives and relatives on this side of her family are also encouraged to consider genetic testing. If any of these relatives do pursue genetic testing, Vero is encouraged to contact me so that we may review the impact of their test results on her.    Summary:  We do not have an explanation for Vero's family history of cancer. While no genetic changes were identified, Vero may still be at risk for certain cancers due to family history, environmental factors, or other genetic causes not identified by this test. Because of that, it is important that she continue with cancer screening based on her personal and family history as discussed above.    Genetic testing is rapidly advancing, and new cancer susceptibility genes will most likely be identified in the future. Therefore, I encouraged Vero to contact me annually or if there are changes in her personal or family history. This may change how we assess her cancer risk, screening, and the testing we would offer.    Plan:  1. A copy of her results was released to her today via the online [employer] portal.   2. She plans to follow-up with BERNARDO Alexandre and her physicians.  3. She should contact me regularly, or sooner if her family history changes.    If Vero has any further questions, I encouraged her to contact me at [phone].    Paige Mitchell MS, [facility] [place]  Licensed, Certified Genetic Counselor  Office: [phone]  Email: [email or web address]

## 2023-06-28 NOTE — NURSING NOTE
Is the patient currently in the state of [place]? YES    Visit mode:VIDEO    If the visit is dropped, the patient can be reconnected by: VIDEO VISIT: Send to e-mail at: [email or web address]    Will anyone else be joining the visit? NO      How would you like to obtain your AVS? MyChart    Are changes needed to the allergy or medication list? NO    Reason for visit: CARLTON Rashid VF

## 2023-06-28 NOTE — PROGRESS NOTES
"Virtual Visit Details    Type of service:  Video Visit     Originating Location (pt. Location): Home  Distant Location (provider location):  Off-site  Platform used for Video Visit: Frances   Time spent over video: 12 minutes    6/28/2023    Referring Provider: Mabel Dumont APRN CNM    Presenting Information:  I spoke to Vero over video today to discuss her genetic testing results. Her blood was drawn on 5/23/23. The CancerNext Panel was ordered from [facility]. This testing was done because of Vero's family history of cancer.    Genetic Testing Result: NEGATIVE  Vero is negative for mutations in the 36 genes analyzed: APC, REYNA, BARD1, BMPR1A, BRCA1, BRCA2, BRIP1, CDH1, CDK4, CDKN2A, CHEK2, DICER1, MLH1, MSH2, MSH6, MUTYH, NBN, NF1, NTHL1, PALB2, PMS2, PTEN, RAD51C, RAD51D, RECQL, SMAD4, SMARCA4, STK11 and TP53 (sequencing and deletion/duplication); AXIN2, HOXB13, MSH3, POLD1 and POLE (sequencing only); EPCAM and GREM1 (deletion/duplication only).     A copy of the test report can be found in the Laboratory tab, dated 5/23/23, and named \"LABORATORY MISCELLANEOUS ORDER\". The report is scanned in as a linked document.    Interpretation:  We discussed several different interpretations of this negative test result.    1. One explanation may be that there is a different gene or combination of genes and environment that are associated with the cancers in this family.  2. It is possible that her relatives have a mutation in one of these genes and she did not inherit it.  3. There is also a small possibility that there is a mutation in one of these genes, and the testing laboratory could not find it with their current testing methods.       Screening:  Based on this negative test result, it is important for Vero and her relatives to refer back to the family history for appropriate cancer screening.      Breast cancer screening is generally recommended to begin approximately 10 years younger than the " earliest age of breast cancer diagnosis in the family, or at age 40, whichever comes first. Based on her mother's diagnosis of breast cancer at age 40, screening for Vero may begin at age 30. She will be turning 28 in August. Based on the personal and family history information she provided, Vero has an estimated 26.3% lifetime risk of developing breast cancer based on the PATRICIO Risk Evaluation v8 model. As such, Vero meets current National Comprehensive Cancer Network (NCCN) guidelines for high risk breast screening. This includes annual breast MRI in addition to annual mammogram, alternating every 6 months. In addition, Vero should be receiving clinical breast exams by her physician. We discussed that Vero could participate in our Cancer Risk Management Program in which our nursing specialist provides an individual screening plan and assists with medical management. A referral was made to see BERNARDO Alexandre for this service.    Other population cancer screening options, such as those recommended by the American Cancer Society and the National Comprehensive Cancer Network (NCCN), are also appropriate for Vero and her family. These screening recommendations may change if there are changes to Vero's personal and/or family history of cancer. Final screening recommendations should be made by each individual's primary care provider.       Inheritance:  We reviewed the autosomal dominant inheritance of mutations in these genes. We discussed that Vero cannot pass on an identifiable mutation in these genes to any future children based on this test result. Mutations in these genes do not skip generations.      Additional Testing Considerations:  Although Vero's genetic testing result was negative, other relatives may still carry a gene mutation associated with an increased risk for cancer. Genetic counseling is recommended for her mother and maternal relatives to discuss genetic testing options. There is  also a reported BRCA2 mutation in her paternal relatives and relatives on this side of her family are also encouraged to consider genetic testing. If any of these relatives do pursue genetic testing, Vero is encouraged to contact me so that we may review the impact of their test results on her.    Summary:  We do not have an explanation for Vero's family history of cancer. While no genetic changes were identified, Vero may still be at risk for certain cancers due to family history, environmental factors, or other genetic causes not identified by this test. Because of that, it is important that she continue with cancer screening based on her personal and family history as discussed above.    Genetic testing is rapidly advancing, and new cancer susceptibility genes will most likely be identified in the future. Therefore, I encouraged Vero to contact me annually or if there are changes in her personal or family history. This may change how we assess her cancer risk, screening, and the testing we would offer.    Plan:  1. A copy of her results was released to her today via the online [employer] portal.   2. She plans to follow-up with BERNARDO Alexandre and her physicians.  3. She should contact me regularly, or sooner if her family history changes.    If Vero has any further questions, I encouraged her to contact me at [phone].    Paige Mitchell MS, [facility] [place]  Licensed, Certified Genetic Counselor  Office: [phone]  Email: [email or web address]

## 2023-06-30 ENCOUNTER — PATIENT OUTREACH (OUTPATIENT)
Dept: ONCOLOGY | Facility: CLINIC | Age: 28
End: 2023-06-30
Payer: COMMERCIAL

## 2023-06-30 NOTE — PATIENT INSTRUCTIONS
Negative Genetic Test Result    Genetic Testing  Genetic testing involved a blood or saliva test which looked at the genetic information in select genes for variants associated with cancer risk.  This testing may have included analysis of a single gene due to a known variant in the family, multiple genes most associated with the cancers in a family, or an expanded panel of genes related to many types of cancers.     Results  There are several possible genetic test results, including:   Positive--a harmful mutation (also known as a  pathogenic  or  likely pathogenic  variant) was identified in a gene associated with increased cancer risk.  These risks, as well as medical management options, depend on the specific genetic variant identified.    Negative--no variants were identified in the genes analyzed   Variant of unknown significance--a variant was identified in one or more genes, though it is currently unclear how this impacts cancer risk in the family.  Genetic testing labs are working to collect evidence about these uncertain variants and may provide updates in the future.    What Does a Negative Genetic Test Result Mean?  A negative genetic test results means that no genetic changes (variants) were detected in the genes tested. While no inherited risk factors for cancer were identified, you and your family may be at risk for certain cancers due to family history, environmental factors, or other genetic causes not identified by this test.  It is also possible that your family may still be at risk of carrying a genetic risk factor which you did not inherit. Your genetic counselor can help interpret the result for you and your relatives.      It is important to note which genes were included in your test. A list of these genes can be found on your test result.    Screening Recommendations  A combination of personal and family history factors may inform cancer risk and medical management recommendations.   Population cancer screening options, such as those recommended by the American Cancer Society and the National Comprehensive Cancer Network (NCCN) are appropriate for many families at average risk for cancer.  However, earlier and/or more frequent screening may be recommended based on personal factors (lifestyle, exposures, medications, screening results), family history of cancer, and sometimes genetic factors.  These cancer risk management options should be discussed in more detail with an individual's medical providers.      Please call us if you have any questions or concerns.   Cancer Risk Management Program (Appointments: [phone])  Sherif Larson, [place] [facility] [place]  [phone]  Luann Crowe, MS, [facility] [place] [phone]  Lilibeth Peterson, MS, [facility] [place]  [phone]  Erica Anderson, MS, [facility] [place]  [phone]  Paige Mitchell, MS, [facility] [place]  [phone]  Unique Foster, MS, [facility] [place] [phone]  Birdie Nichols, MS, [facility] [place] [phone]

## 2023-06-30 NOTE — PROGRESS NOTES
Writer received referral to Cancer Risk Management/Genetic Counseling.    Referred for: to see RACHEAL Alexandre-CNS for high risk breast screening- REF by Paige Mitchell, [facility] [place]     Referral reviewed for appropriate plan, and sent to New Patient Scheduling for completion.    Shavon Hernández, RN, BSN  Oncology New Patient Nurse Navigator   [facility]  [phone]

## 2023-07-19 ENCOUNTER — VIRTUAL VISIT (OUTPATIENT)
Dept: ONCOLOGY | Facility: CLINIC | Age: 28
End: 2023-07-19
Attending: GENETIC COUNSELOR, MS
Payer: COMMERCIAL

## 2023-07-19 VITALS — BODY MASS INDEX: 28.32 KG/M2 | WEIGHT: 170 LBS | HEIGHT: 65 IN

## 2023-07-19 DIAGNOSIS — F40.240 CLAUSTROPHOBIA: ICD-10-CM

## 2023-07-19 DIAGNOSIS — Z12.39 BREAST CANCER SCREENING, HIGH RISK PATIENT: Primary | ICD-10-CM

## 2023-07-19 DIAGNOSIS — R92.30 DENSE BREAST: ICD-10-CM

## 2023-07-19 DIAGNOSIS — Z80.3 FAMILY HISTORY OF MALIGNANT NEOPLASM OF BREAST: ICD-10-CM

## 2023-07-19 PROCEDURE — 99203 OFFICE O/P NEW LOW 30 MIN: CPT | Mod: VID | Performed by: CLINICAL NURSE SPECIALIST

## 2023-07-19 RX ORDER — ALPRAZOLAM 1 MG
1 TABLET ORAL
Qty: 1 TABLET | Refills: 0 | Status: SHIPPED | OUTPATIENT
Start: 2023-07-19 | End: 2023-12-13

## 2023-07-19 ASSESSMENT — PAIN SCALES - GENERAL: PAINLEVEL: NO PAIN (0)

## 2023-07-19 NOTE — NURSING NOTE
Is the patient currently in the state of [place]? YES    Visit mode:VIDEO    If the visit is dropped, the patient can be reconnected by: VIDEO VISIT: Send to e-mail at: [email or web address]    Will anyone else be joining the visit? NO      How would you like to obtain your AVS? MyChart    Are changes needed to the allergy or medication list? NO    Reason for visit: Consult      JAVI Carolina

## 2023-07-19 NOTE — PATIENT INSTRUCTIONS
Individualized Surveillance Plan for women  With 20% or greater lifetime risk of breast cancer   Per NCCN Breast Cancer Screening and Diagnosis Guidelines Version 1.2023   Recommended screening Test or procedure Last done Next Scheduled    Clinical encounter Clinical exam every 6-12 months.   Refer to genetic counseling if not already done.  Consider risk reduction strategies.   Defer   July 2024 with mammogram   However, some family histories with breast cancers at a very young age, may warrant screening starting earlier.    *May begin at age 40 if breast cancers in the family occur at later ages.    Annual mammogram beginning 10 years younger than the earliest breast cancer in the family but not prior to age 30.    Recommend annual breast MRI to begin 10 years younger than the earliest breast cancer in the family but not prior to age 25.    Breast MRIs are preferably done on day 7-15 of the menstrual cycle in premenopausal women.   None to date   Mammogram soon    Breast MRI in 6 months (January 2024)   Breast screening for patients at high risk due to thoracic radiation between the ages of 10-30   Annual clinical exam beginning 8 years after radiation therapy.    Annual screening mammogram beginning at age 30 or 8 years after radiation therapy    Annual breast MRI, beginning at age 25 or 8 years after radiation therapy.       NA     NA   Women who have a lifetime risk of >20% based on history of LCIS or ADH/ALH Annual screening mammogram beginning at age of LCIS or ADH/ALH but not prior to age 30.    Consider annual MRI to begin at age of diagnosis of LCIS or ADH/ALH but not prior to age 25.    Strongly recommend risk reducing strategies if possible     NA     NA    Recommend risk reducing strategies for women with 1.7% 5 year risk of breast cancer.

## 2023-07-19 NOTE — PROGRESS NOTES
Virtual Visit Details    Type of service:  Video Visit     Originating Location (pt. Location): Home  Distant Location (provider location):  Off-site  Platform used for Video Visit: Frances    Oncology Risk Management Consultation:  Date on this visit: 2023    Vero Honeycutt  is referred by Paige [facility],  for an oncology risk management consultation. She requires high risk screening and surveillance to reduce her risk of cancer secondary to having a family history of breast cancer in her mother at 40.. She is considered to be at high risk for breast cancer and has a 26.3% lifetime risk for breast cancer by the PATRICIO model.     Primary Physician: Mabel Dumont CNM    History Of Present Illness:  Ms. Honeycutt is a very pleasant, healthy 27 year old female who presents with a family history of breast cancer.    Pertinent history:  Menarche at: 12  Nulliparous  Menopausal status: premenopausal  Ovaries, fallopian tubes and uterus intact  Breast Density: unknown  Currently using Mirena IUD  Hx of breast reduction in   Hx of breast biopsies: none  Hx of atypia or malignancy.      Genetic testin23 -- Genetic Testing Result: NEGATIVE  Vero is negative for mutations in the 36 genes analyzed: APC, REYNA, BARD1, BMPR1A, BRCA1, BRCA2, BRIP1, CDH1, CDK4, CDKN2A, CHEK2, DICER1, MLH1, MSH2, MSH6, MUTYH, NBN, NF1, NTHL1, PALB2, PMS2, PTEN, RAD51C, RAD51D, RECQL, SMAD4, SMARCA4, STK11 and TP53 (sequencing and deletion/duplication); AXIN2, HOXB13, MSH3, POLD1 and POLE (sequencing only); EPCAM and GREM1 (deletion/duplication only) the CancerNext Panel from [facility].     Pertinent screening history:  None to date    At this visit, she denies new fatigue, breast pain, asymmetry, lumps, masses, thickening, nipple discharge and skin changes in her breasts.      Past Medical History:   Diagnosis Date     Urinary tract infection I had about three in 2018, but the issue resolved     Past Surgical History:    Procedure Laterality Date     HC REDUCTION OF LARGE BREAST      Description: Breast Surgery Reduction Procedure;  Recorded: 2014;     MAMMOPLASTY REDUCTION BILATERAL       ORTHOPEDIC SURGERY  ACL reconstruction 2016     DE HYMENOTOMY, SIMPLE INCISION      Description: Hymenotomy;  Recorded: 2013;     WISDOM TOOTH EXTRACTION         Allergies:  Allergies as of 2023     (No Known Allergies)       Current Medications:  Current Outpatient Medications   Medication Sig Dispense Refill     ALPRAZolam (XANAX) 1 MG tablet Take 1 tablet (1 mg) by mouth once as needed for anxiety (prior to breast MRI) 1 tablet 0     levonorgestrel (MIRENA) 20 MCG/24HR IUD 1 each by Intrauterine route          Family History:  Family History   Problem Relation Age of Onset     Breast Cancer Mother 40     Osteoporosis Mother         She has osteopenia     Colon Polyps Mother         2-5     Alcoholism Father      Anxiety Disorder Father      Depression Father      Breast Cancer Maternal Grandmother 76     Pancreatic Cancer Maternal Grandmother 79         at 80     Cancer Maternal Grandfather         testicular     Prostate Cancer Maternal Grandfather 90         at 103     Genetic Disorder Paternal Uncle         BRCA2+; pt is negative     Asthma Niece      Breast Cancer Paternal Cousin 30        BRCA2+, pt is negative       Social History:  Social History     Socioeconomic History     Marital status: Single     Spouse name: Not on file     Number of children: 0     Years of education: Not on file     Highest education level: Not on file   Occupational History     Occupation: Marketing business, online     Employer: [employer]   Tobacco Use     Smoking status: Never     Smokeless tobacco: Never   Vaping Use     Vaping Use: Never used   Substance and Sexual Activity     Alcohol use: Yes     Alcohol/week: 1.7 standard drinks of alcohol     Drug use: No     Sexual activity: Yes     Partners: Male     Birth  "control/protection: I.U.D.   Other Topics Concern     Parent/sibling w/ CABG, MI or angioplasty before 65F 55M? 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  No discharge or erythema, or obvious scleral/conjunctival abnormalities.  RESP: No audible wheeze, cough, or visible cyanosis.  No visible retractions or increased work of breathing.    SKIN: Visible skin clear. No significant rash, abnormal pigmentation or lesions.  NEURO: Cranial nerves grossly intact.  Mentation and speech appropriate for age.  PSYCH: Mentation appears normal, affect normal/bright, judgement and insight intact, normal speech and appearance well-groomed.    Laboratory/Imaging Studies  No results found for any visits on 07/19/23.    ASSESSMENT  We discussed that a breast MRI would be recommended for her, with the understanding that insurance may or may not cover it entirely. The breast MRI in high-risk women has a higher sensitivity than mammography, and the combination of mammography and MRI in this population has the highest sensitivity. In a high-risk population, MRI and mammography combined have a higher sensitivity (92.7%) than US and mammography combined (52%) (Source: Ivette GIL, Alfie ISAACS, Mihir MONSON, et al. Detection of breast cancer with addition of annual screening ultrasound or a single screening MRI to mammography in women with elevated breast cancer risk. " RAFAELA.2012;307(13):1014-0410. ) Therefore, in high-risk women for whom supplemental screening is indicated, MRI is recommended when possible. Screening high-risk women with breast MRI is cost-effective, and the cost-effectiveness of screening MRI increases with increasing breast cancer risk ( Sheldon et. al 2006 and Albino et al. 2009). The National Comprehensive Cancer Society, American Cancer Society and American College of Radiologists recommend breast-screening MRI in high-risk women.    In her case, I have ordered a breast MRI (IMG 1045) using the diagnostic codes:  Breast Screening, high risk patient (Z12.39)  Family history of breast cancer (Z80.3)    She was advised to check with her insurance company and request a cost of care estimate using [employer]'s My Chart function.     We reviewed signs and symptoms to be watchful for in between visits.  She verbalized understanding of signs and symptoms to address with her health care providers in between visits, including lumps, thickening, swelling, tenderness, nipple discharge or changes in skin of breasts.    We also discussed following  a healthy lifestyle plan recommended by both NCCN :    Healthy lifestyle for breast cancer risk reduction    Consider breast cancer risks associated with combined estrogen/ progesterone agents ?3-5 year's duration of use.      Any alcohol intake increases the risk for breast cancer and is best avoided. Patients who choose to drink alcohol should limit their    consumption to no more than one drink equivalent in a day and no more than three per week.      Exercise - Be active daily; avoid being sedentary. Take part in 150-300 minutes of moderate-intensity physical activity per week; exceeding the upper limit is optimal.      Weight control -Evidence suggests that maintaining a healthy body weight (20-25 BMI) helps reduce breast cancer risk.    Individualized Surveillance Plan for women  With 20% or greater lifetime risk of breast  cancer   Per NCCN Breast Cancer Screening and Diagnosis Guidelines Version 1.2023   Recommended screening Test or procedure Last done Next Scheduled    Clinical encounter Clinical exam every 6-12 months.   Refer to genetic counseling if not already done.  Consider risk reduction strategies.   Defer   July 2024 with mammogram   However, some family histories with breast cancers at a very young age, may warrant screening starting earlier.    *May begin at age 40 if breast cancers in the family occur at later ages.    Annual mammogram beginning 10 years younger than the earliest breast cancer in the family but not prior to age 30.    Recommend annual breast MRI to begin 10 years younger than the earliest breast cancer in the family but not prior to age 25.    Breast MRIs are preferably done on day 7-15 of the menstrual cycle in premenopausal women.   None to date   Mammogram soon    Breast MRI in 6 months (January 2024)   Breast screening for patients at high risk due to thoracic radiation between the ages of 10-30   Annual clinical exam beginning 8 years after radiation therapy.    Annual screening mammogram beginning at age 30 or 8 years after radiation therapy    Annual breast MRI, beginning at age 25 or 8 years after radiation therapy.       NA     NA   Women who have a lifetime risk of >20% based on history of LCIS or ADH/ALH Annual screening mammogram beginning at age of LCIS or ADH/ALH but not prior to age 30.    Consider annual MRI to begin at age of diagnosis of LCIS or ADH/ALH but not prior to age 25.    Strongly recommend risk reducing strategies if possible     NA     NA    Recommend risk reducing strategies for women with 1.7% 5 year risk of breast cancer.       I spent a total of 38 minutes on the day of the visit. Please see the note for further information on patient assessment and treatment.    Melissa Lisa, RACHEAL-CNS, OCN, AGN-BC  Clinical Nurse Specialist  Cancer Risk Management  Program  [facility]    CC;  Mabel Dumont,FREDM

## 2023-07-19 NOTE — LETTER
2023         RE: Vero Honeycutt  [address]  [place] [address]        Dear Colleague,    Thank you for referring your patient, Vero Honeycutt, to the [facility]. Please see a copy of my visit note below.    Virtual Visit Details    Type of service:  Video Visit     Originating Location (pt. Location): Home  Distant Location (provider location):  Off-site  Platform used for Video Visit: Frances    Oncology Risk Management Consultation:  Date on this visit: 2023    Vero Honeycutt  is referred by Paige Mitchell [facility],  for an oncology risk management consultation. She requires high risk screening and surveillance to reduce her risk of cancer secondary to having a family history of breast cancer in her mother at 40.. She is considered to be at high risk for breast cancer and has a 26.3% lifetime risk for breast cancer by the PATRICIO model.     Primary Physician: Mabel Dumont CNM    History Of Present Illness:  Ms. Honeycutt is a very pleasant, healthy 27 year old female who presents with a family history of breast cancer.    Pertinent history:  Menarche at: 12  Nulliparous  Menopausal status: premenopausal  Ovaries, fallopian tubes and uterus intact  Breast Density: unknown  Currently using Mirena IUD  Hx of breast reduction in   Hx of breast biopsies: none  Hx of atypia or malignancy.      Genetic testin23 -- Genetic Testing Result: NEGATIVE  Vero is negative for mutations in the 36 genes analyzed: APC, REYNA, BARD1, BMPR1A, BRCA1, BRCA2, BRIP1, CDH1, CDK4, CDKN2A, CHEK2, DICER1, MLH1, MSH2, MSH6, MUTYH, NBN, NF1, NTHL1, PALB2, PMS2, PTEN, RAD51C, RAD51D, RECQL, SMAD4, SMARCA4, STK11 and TP53 (sequencing and deletion/duplication); AXIN2, HOXB13, MSH3, POLD1 and POLE (sequencing only); EPCAM and GREM1 (deletion/duplication only) the CancerNext Panel from [employer].     Pertinent screening history:  None to date    At this visit, she denies new  fatigue, breast pain, asymmetry, lumps, masses, thickening, nipple discharge and skin changes in her breasts.      Past Medical History:   Diagnosis Date    Urinary tract infection I had about three in 2018, but the issue resolved     Past Surgical History:   Procedure Laterality Date    HC REDUCTION OF LARGE BREAST      Description: Breast Surgery Reduction Procedure;  Recorded: 2014;    MAMMOPLASTY REDUCTION BILATERAL      ORTHOPEDIC SURGERY  ACL reconstruction 2016    VT HYMENOTOMY, SIMPLE INCISION      Description: Hymenotomy;  Recorded: 2013;    WISDOM TOOTH EXTRACTION         Allergies:  Allergies as of 2023    (No Known Allergies)       Current Medications:  Current Outpatient Medications   Medication Sig Dispense Refill    ALPRAZolam (XANAX) 1 MG tablet Take 1 tablet (1 mg) by mouth once as needed for anxiety (prior to breast MRI) 1 tablet 0    levonorgestrel (MIRENA) 20 MCG/24HR IUD 1 each by Intrauterine route        Family History:  Family History   Problem Relation Age of Onset    Breast Cancer Mother 40    Osteoporosis Mother         She has osteopenia    Colon Polyps Mother         2-5    Alcoholism Father     Anxiety Disorder Father     Depression Father     Breast Cancer Maternal Grandmother 76    Pancreatic Cancer Maternal Grandmother 79         at 80    Cancer Maternal Grandfather         testicular    Prostate Cancer Maternal Grandfather 90         at 103    Genetic Disorder Paternal Uncle         BRCA2+; pt is negative    Asthma Niece     Breast Cancer Paternal Cousin 30        BRCA2+, pt is negative     Social History:  Social History     Socioeconomic History    Marital status: Single     Spouse name: Not on file    Number of children: 0    Years of education: Not on file    Highest education level: Not on file   Occupational History    Occupation: Marketing business, online     Employer: [employer]   Tobacco Use    Smoking status: Never    Smokeless  "tobacco: Never   Vaping Use    Vaping Use: Never used   Substance and Sexual Activity    Alcohol use: Yes     Alcohol/week: 1.7 standard drinks of alcohol    Drug use: No    Sexual activity: Yes     Partners: Male     Birth control/protection: I.U.D.   Other Topics Concern    Parent/sibling w/ CABG, MI or angioplasty before 65F 55M? 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  No discharge or erythema, or obvious scleral/conjunctival abnormalities.  RESP: No audible wheeze, cough, or visible cyanosis.  No visible retractions or increased work of breathing.    SKIN: Visible skin clear. No significant rash, abnormal pigmentation or lesions.  NEURO: Cranial nerves grossly intact.  Mentation and speech appropriate for age.  PSYCH: Mentation appears normal, affect normal/bright, judgement and insight intact, normal speech and appearance well-groomed.    Laboratory/Imaging Studies  No results found for any visits on 07/19/23.    ASSESSMENT  We discussed that a breast MRI would be recommended for her, with the understanding that insurance may or may not cover it entirely. The breast MRI in high-risk women has a higher sensitivity than mammography, and the combination of mammography and MRI in this population has the highest sensitivity. In a high-risk population, MRI and mammography combined have a higher sensitivity (92.7%) than US and " mammography combined (52%) (Source: Ivette GIL, Alfie Z, Mihir D, et al. Detection of breast cancer with addition of annual screening ultrasound or a single screening MRI to mammography in women with elevated breast cancer risk. RAFAELA.2012;307(13):7869-4664. ) Therefore, in high-risk women for whom supplemental screening is indicated, MRI is recommended when possible. Screening high-risk women with breast MRI is cost-effective, and the cost-effectiveness of screening MRI increases with increasing breast cancer risk ( Sheldon et. al 2006 and Albino et al. 2009). The National Comprehensive Cancer Society, American Cancer Society and American College of Radiologists recommend breast-screening MRI in high-risk women.    In her case, I have ordered a breast MRI (IMG 1045) using the diagnostic codes:  Breast Screening, high risk patient (Z12.39)  Family history of breast cancer (Z80.3)    She was advised to check with her insurance company and request a cost of care estimate using [employer]'s My Chart function.     We reviewed signs and symptoms to be watchful for in between visits.  She verbalized understanding of signs and symptoms to address with her health care providers in between visits, including lumps, thickening, swelling, tenderness, nipple discharge or changes in skin of breasts.    We also discussed following  a healthy lifestyle plan recommended by both NCCN :    Healthy lifestyle for breast cancer risk reduction  Consider breast cancer risks associated with combined estrogen/ progesterone agents ?3-5 year's duration of use.    Any alcohol intake increases the risk for breast cancer and is best avoided. Patients who choose to drink alcohol should limit their  consumption to no more than one drink equivalent in a day and no more than three per week.    Exercise - Be active daily; avoid being sedentary. Take part in 150-300 minutes of moderate-intensity physical activity per week; exceeding the upper limit is  optimal.    Weight control -Evidence suggests that maintaining a healthy body weight (20-25 BMI) helps reduce breast cancer risk.    Individualized Surveillance Plan for women  With 20% or greater lifetime risk of breast cancer   Per NCCN Breast Cancer Screening and Diagnosis Guidelines Version 1.2023   Recommended screening Test or procedure Last done Next Scheduled    Clinical encounter Clinical exam every 6-12 months.   Refer to genetic counseling if not already done.  Consider risk reduction strategies.   Defer   July 2024 with mammogram   However, some family histories with breast cancers at a very young age, may warrant screening starting earlier.    *May begin at age 40 if breast cancers in the family occur at later ages.    Annual mammogram beginning 10 years younger than the earliest breast cancer in the family but not prior to age 30.    Recommend annual breast MRI to begin 10 years younger than the earliest breast cancer in the family but not prior to age 25.    Breast MRIs are preferably done on day 7-15 of the menstrual cycle in premenopausal women.   None to date   Mammogram soon    Breast MRI in 6 months (January 2024)   Breast screening for patients at high risk due to thoracic radiation between the ages of 10-30   Annual clinical exam beginning 8 years after radiation therapy.    Annual screening mammogram beginning at age 30 or 8 years after radiation therapy    Annual breast MRI, beginning at age 25 or 8 years after radiation therapy.       NA     NA   Women who have a lifetime risk of >20% based on history of LCIS or ADH/ALH Annual screening mammogram beginning at age of LCIS or ADH/ALH but not prior to age 30.    Consider annual MRI to begin at age of diagnosis of LCIS or ADH/ALH but not prior to age 25.    Strongly recommend risk reducing strategies if possible     NA     NA    Recommend risk reducing strategies for women with 1.7% 5 year risk of breast cancer.       I spent a total of 38  minutes on the day of the visit. Please see the note for further information on patient assessment and treatment.    RACHEAL Hyman-CNS, OCN, AGN-BC  Clinical Nurse Specialist  Cancer Risk Management Program  [facility]    CC;  Mabel Dumont CNM

## 2023-07-28 ENCOUNTER — OFFICE VISIT (OUTPATIENT)
Dept: DERMATOLOGY | Facility: CLINIC | Age: 28
End: 2023-07-28
Attending: ADVANCED PRACTICE MIDWIFE
Payer: COMMERCIAL

## 2023-07-28 DIAGNOSIS — D22.9 MULTIPLE BENIGN NEVI: ICD-10-CM

## 2023-07-28 DIAGNOSIS — Z00.00 ANNUAL PHYSICAL EXAM: ICD-10-CM

## 2023-07-28 DIAGNOSIS — D49.2 NEOPLASM OF SKIN: ICD-10-CM

## 2023-07-28 DIAGNOSIS — L70.0 ACNE VULGARIS: Primary | ICD-10-CM

## 2023-07-28 PROCEDURE — 11102 TANGNTL BX SKIN SINGLE LES: CPT | Performed by: DERMATOLOGY

## 2023-07-28 PROCEDURE — 88305 TISSUE EXAM BY PATHOLOGIST: CPT | Mod: TC | Performed by: DERMATOLOGY

## 2023-07-28 PROCEDURE — 99203 OFFICE O/P NEW LOW 30 MIN: CPT | Mod: 25 | Performed by: DERMATOLOGY

## 2023-07-28 PROCEDURE — 88305 TISSUE EXAM BY PATHOLOGIST: CPT | Mod: 26 | Performed by: DERMATOLOGY

## 2023-07-28 RX ORDER — TRETINOIN 0.25 MG/G
CREAM TOPICAL
Qty: 45 G | Refills: 11 | Status: SHIPPED | OUTPATIENT
Start: 2023-07-28 | End: 2024-08-07

## 2023-07-28 NOTE — NURSING NOTE
Dermatology Rooming Note    Vero Honeycutt's goals for this visit include:   Chief Complaint   Patient presents with    Skin Check     FBSE, pt has a concern about a mole on her lower back.     Ed Fong, EMT-B

## 2023-07-28 NOTE — LETTER
7/28/2023       RE: Vero Honeycutt  [address]  [place] [address]     Dear Colleague,    Thank you for referring your patient, Vero Honeycutt, to the [facility] [place] [facility]. Please see a copy of my visit note below.    [facility] Dermatology Note  Encounter Date: Jul 28, 2023  Office Visit     Dermatology Problem List:  # Acne.  - Tretinoin 0.025% cream  # Involuted/resolved hemangioma, R upper cheek.  # Neoplasm of uncertain behavior, L lower back, s/p shave bx 7/28/23    ____________________________________________    Assessment & Plan:     # Acne.  - Start tretinoin 0.025% cream at bedtime    # Involuted/resolved hemangioma, R upper cheek.    # Neoplasm of uncertain behavior, L lower back, s/p shave bx 7/28/23  - ddx symptomatic nevus  - see shave bx note    # Multiple benign nevi.   - Monitor for ABCDEs of melanoma   - Continue sun protection - recommend SPF 30 or higher with frequent application   - Return sooner if noticing changing or symptomatic lesions    Procedures Performed:   - Shave biopsy procedure note, location(s): see above. After discussion of benefits and risks including but not limited to bleeding, infection, scar, incomplete removal, recurrence, and non-diagnostic biopsy, written consent and photographs were obtained. The area was cleaned with isopropyl alcohol. 0.5mL of 1% lidocaine with epinephrine was injected to obtain adequate anesthesia of lesion(s). Shave biopsy at site(s) performed. Hemostasis was achieved with aluminium chloride. Petrolatum ointment and a sterile dressing were applied. The patient tolerated the procedure and no complications were noted. The patient was provided with verbal and written post care instructions.     Follow-up: 1 year, sooner if concerns.     Staff:     Sayda Bess MD    Department of Dermatology  [facility]  [facility]: Phone: [phone], Fax: [phone]  8/12/2023    ____________________________________________    CC: Skin Check (FBSE, pt has a concern about a mole on her lower back.)    HPI:  Ms. Vero Honeycutt is a(n) 27 year old female who presents today as a new patient for above.    Spot on back been there for a while, sometimes rubs on clothing/purses  Mom had something taken off of nose, doesn't think melanoma but not sure    No h/o abnormal moles    Patient is otherwise feeling well, without additional skin concerns.     Labs Reviewed:  N/A    Physical Exam:  Vitals: There were no vitals taken for this visit.  SKIN: Total skin excluding the undergarment areas was performed. The exam included the head/face, neck, both arms, chest, back, abdomen, both legs, digits and/or nails.   - fleshy papule left lower back  - fleshy thin round plaque on R upper cheek at site of prior hemangioma  - Multiple regular brown pigmented macules and papules are identified on the trunk and extremities. .   - No other lesions of concern on areas examined.     Medications:  Current Outpatient Medications   Medication    levonorgestrel (MIRENA) 20 MCG/24HR IUD    ALPRAZolam (XANAX) 1 MG tablet     No current facility-administered medications for this visit.      Past Medical History:   Patient Active Problem List   Diagnosis    Family history of malignant neoplasm of breast    IUD (intrauterine device) in place     Past Medical History:   Diagnosis Date    Urinary tract infection I had about three in 2018, but the issue resolved       CC Mabel Dumont, APRN CNM  [address] [facility] [address]  [place] [address] on close of this encounter.    Lidocaine-epinephrine 1-1:952367 % injection   1.5 mL once for one use, starting 7/28/2023 ending 7/28/2023,  2mL disp, R-0, injection  Injected by Ed Fong, EMT-B

## 2023-07-28 NOTE — PROGRESS NOTES
Lidocaine-epinephrine 1-1:521402 % injection   1.5 mL once for one use, starting 7/28/2023 ending 7/28/2023,  2mL disp, R-0, injection  Injected by Ed Fong, EMT-B

## 2023-07-28 NOTE — PROGRESS NOTES
[facility] Dermatology Note  Encounter Date: Jul 28, 2023  Office Visit     Dermatology Problem List:  # Acne.  - Tretinoin 0.025% cream  # Involuted/resolved hemangioma, R upper cheek.  # Neoplasm of uncertain behavior, L lower back, s/p shave bx 7/28/23    ____________________________________________    Assessment & Plan:     # Acne.  - Start tretinoin 0.025% cream at bedtime    # Involuted/resolved hemangioma, R upper cheek.    # Neoplasm of uncertain behavior, L lower back, s/p shave bx 7/28/23  - ddx symptomatic nevus  - see shave bx note    # Multiple benign nevi.   - Monitor for ABCDEs of melanoma   - Continue sun protection - recommend SPF 30 or higher with frequent application   - Return sooner if noticing changing or symptomatic lesions    Procedures Performed:   - Shave biopsy procedure note, location(s): see above. After discussion of benefits and risks including but not limited to bleeding, infection, scar, incomplete removal, recurrence, and non-diagnostic biopsy, written consent and photographs were obtained. The area was cleaned with isopropyl alcohol. 0.5mL of 1% lidocaine with epinephrine was injected to obtain adequate anesthesia of lesion(s). Shave biopsy at site(s) performed. Hemostasis was achieved with aluminium chloride. Petrolatum ointment and a sterile dressing were applied. The patient tolerated the procedure and no complications were noted. The patient was provided with verbal and written post care instructions.     Follow-up: 1 year, sooner if concerns.     Staff:     Sayda Bess MD    Department of Dermatology  [facility]: Phone: [phone], Fax: [phone]  8/12/2023    ____________________________________________    CC: Skin Check (FBSE, pt has a concern about a mole on her lower back.)    HPI:  Ms. Vero Honeycutt is a(n) 27 year old female who presents today as a  new patient for above.    Spot on back been there for a while, sometimes rubs on clothing/purses  Mom had something taken off of nose, doesn't think melanoma but not sure    No h/o abnormal moles    Patient is otherwise feeling well, without additional skin concerns.     Labs Reviewed:  N/A    Physical Exam:  Vitals: There were no vitals taken for this visit.  SKIN: Total skin excluding the undergarment areas was performed. The exam included the head/face, neck, both arms, chest, back, abdomen, both legs, digits and/or nails.   - fleshy papule left lower back  - fleshy thin round plaque on R upper cheek at site of prior hemangioma  - Multiple regular brown pigmented macules and papules are identified on the trunk and extremities. .   - No other lesions of concern on areas examined.     Medications:  Current Outpatient Medications   Medication    levonorgestrel (MIRENA) 20 MCG/24HR IUD    ALPRAZolam (XANAX) 1 MG tablet     No current facility-administered medications for this visit.      Past Medical History:   Patient Active Problem List   Diagnosis    Family history of malignant neoplasm of breast    IUD (intrauterine device) in place     Past Medical History:   Diagnosis Date    Urinary tract infection I had about three in 2018, but the issue resolved       CC Mabel Dumont, APRN CNM  [address] [place] [address] on close of this encounter.

## 2023-08-01 LAB
PATH REPORT.COMMENTS IMP SPEC: NORMAL
PATH REPORT.COMMENTS IMP SPEC: NORMAL
PATH REPORT.FINAL DX SPEC: NORMAL
PATH REPORT.GROSS SPEC: NORMAL
PATH REPORT.MICROSCOPIC SPEC OTHER STN: NORMAL
PATH REPORT.RELEVANT HX SPEC: NORMAL

## 2023-08-12 NOTE — PATIENT INSTRUCTIONS
Wound Care After a Biopsy    What is a skin biopsy?  A skin biopsy allows the doctor to examine a very small piece of tissue under the microscope to determine the diagnosis and the best treatment for the skin condition. A local anesthetic (numbing medicine) is injected with a very small needle into the skin area to be tested. A small piece of skin is taken from the area. Sometimes a suture (stitch) is used.     What are the risks of a skin biopsy?  I will experience scar, bleeding, swelling, pain, crusting and redness. I may experience incomplete removal or recurrence. Risks of this procedure are excessive bleeding, bruising, infection, nerve damage, numbness, thick (hypertrophic or keloidal) scar and non-diagnostic biopsy.    How should I care for my wound for the first 24 hours?  Keep the wound dry and covered for 24 hours  If it bleeds, hold direct pressure on the area for 15 minutes. If bleeding does not stop, call us or go to the emergency room  Avoid strenuous exercise the first 1-2 days or as your doctor instructs you    How should I care for the wound after 24 hours?  After 24 hours, remove the bandage  You may bathe or shower as normal  If you had a scalp biopsy, you can shampoo as usual and can use shower water to clean the biopsy site daily  Clean the wound once a day with gentle soap and water  Do not scrub, be gentle  Apply white petroleum/Vaseline after cleaning the wound with a cotton swab or a clean finger, and keep the site covered with a Bandaid /bandage. Bandages are not necessary with a scalp biopsy  If you are unable to cover the site with a Bandaid /bandage, re-apply ointment 2-3 times a day to keep the site moist. Moisture will help with healing  Avoid strenuous activity for first 1-2 days  Avoid lakes, rivers, pools, and oceans until the stitches are removed or the site is healed    How do I clean my wound?  Wash hands thoroughly with soap or use hand  before all wound care  Clean  the wound with gentle soap and water  Apply white petroleum/Vaseline  to wound after it is clean  Replace the Bandaid /bandage to keep the wound covered for the first few days or as instructed by your doctor  If you had a scalp biopsy, warm shower water to the area on a daily basis should suffice    What should I use to clean my wound?   Cotton-tipped applicators (Qtips )  White petroleum jelly (Vaseline ). Use a clean new container and use Q-tips to apply.  Bandaids  as needed  Gentle soap     How should I care for my wound long term?  Do not get your wound dirty  Keep up with wound care for one week or until the area is healed.  If you have stitches, stitches need to be removed in 14 days. You may return to our clinic for this or you may have it done locally at your doctor s office.  A small scab will form and fall off by itself when the area is completely healed. The area will be red and will become pink in color as it heals. Sun protection is very important for how your scar will turn out. Sunscreen with an SPF 30 or greater is recommended once the area is healed.  You should have some soreness but it should be mild and slowly go away over several days. Talk to your doctor about using tylenol for pain,    When should I call my doctor?  If you have increased:   Pain or swelling  Pus or drainage (clear or slightly yellow drainage is ok)  Temperature over 100F  Spreading redness or warmth around wound    When will I hear about my results?  The biopsy results can take 2 weeks to come back.  Your results will automatically release to [employer] before your provider has even reviewed them.  The clinic will call you with the results, send you a [employer] message, or have you schedule a follow-up clinic or phone time to discuss the results.  Contact our clinics if you do not hear from us in 2 weeks.    Who should I call with questions?  [facility]: [phone]  [facility]  [facility]: [phone]  For urgent needs outside of business hours call the [facility] at [phone] and ask for the dermatology resident on call     Checking for Skin Cancer  You can help find cancer early by checking your skin each month. There are 3 main kinds of skin cancer: melanoma, basal cell carcinoma, and squamous cell carcinoma. Doing monthly skin checks is the best way to find new marks, sores, or skin changes. Follow these instructions for checking your skin.   The ABCDEs of checking moles for melanoma   Check your moles or growths for signs of melanoma using ABCDE:   Asymmetry: The sides of the mole or growth don t match.  Border: The edges are ragged, notched, or blurred.  Color: The color within the mole or growth varies. It could be black, brown, tan, white, or shades of red, gray, or blue.  Diameter: The mole or growth is larger than   inch or 6 mm (size of a pencil eraser).  Evolving: The size, shape, texture, or color of the mole or growth is changing.     ABCDE's of moles on light skin.        ABCDE's of moles on dark skin may be harder to identify.     Checking for other types of skin cancer  Basal cell carcinoma or squamous cell carcinoma cause symptoms like:     A spot or mole that looks different from all other marks on your skin  Changes in how an area feels, such as itching, tenderness, or pain  Changes in the skin's surface, such as oozing, bleeding, or scaliness  A sore that doesn't heal  New swelling, redness, or spread of color beyond the border of a mole    Who s at risk?  Anyone of any skin color can get skin cancer. But you're at greater risk if you have:   Fair skin that freckles easily and burns instead of tanning  Light-colored or red hair  Light-colored eyes  Many moles or abnormal moles on your skin  A long history of unprotected exposure to sunlight or tanning beds  A history of many blistering sunburns as a child or teen  A family history of skin cancer  Been  exposed to radiation or chemicals  A weakened immune system  Been exposed to arsenic  If you've had skin cancer in the past, you're at high risk of having it again.   How to check your skin  Do your monthly skin checkups in front of a full-length mirror. Use a room with good lighting so it's easier to see. Use a hand mirror to look at hard-to-see places like your buttocks and back. You can also have a trusted friend or family member help you with these checks. Check every part of your body, including your:   Head (ears, face, neck, and scalp)  Torso (front, back, sides, and under breasts)  Arms (tops, undersides, and armpits)  Hands (palms, backs, and fingers, including under the nails)  Lower back, buttocks, and genitals  Legs (front, back, and sides)  Feet (tops, soles, toes, including under the nails, and between toes)  Watch for new spots on your skin or a spot that's changing in color, shape, size.   If you have a lot of moles, take digital photos of them each month. Make sure to take photos both up close and from a distance. These can help you see if any moles change over time.   Know your skin  Most skin changes aren't cancer. But if you see any changes in your skin, call your healthcare provider right away. Only they can tell you if a change is a problem. If you have skin cancer, seeing your provider can be the first step to getting the treatment that could save your life.   [employer] last reviewed this educational content on 10/1/2021    4539-5501 [employer]. All rights reserved. This information is not intended as a substitute for professional medical care. Always follow your healthcare professional's instructions.

## 2023-08-21 ENCOUNTER — ANCILLARY PROCEDURE (OUTPATIENT)
Dept: MAMMOGRAPHY | Facility: CLINIC | Age: 28
End: 2023-08-21
Attending: CLINICAL NURSE SPECIALIST
Payer: COMMERCIAL

## 2023-08-21 DIAGNOSIS — R92.30 DENSE BREAST: ICD-10-CM

## 2023-08-21 DIAGNOSIS — Z80.3 FAMILY HISTORY OF MALIGNANT NEOPLASM OF BREAST: ICD-10-CM

## 2023-08-21 DIAGNOSIS — Z12.39 BREAST CANCER SCREENING, HIGH RISK PATIENT: ICD-10-CM

## 2023-08-21 DIAGNOSIS — Z12.39 BREAST CANCER SCREENING, HIGH RISK PATIENT: Primary | ICD-10-CM

## 2023-08-21 PROCEDURE — 77067 SCR MAMMO BI INCL CAD: CPT

## 2023-08-21 PROCEDURE — 77063 BREAST TOMOSYNTHESIS BI: CPT

## 2023-09-21 ENCOUNTER — E-VISIT (OUTPATIENT)
Dept: URGENT CARE | Facility: CLINIC | Age: 28
End: 2023-09-21
Payer: COMMERCIAL

## 2023-09-21 DIAGNOSIS — N39.0 ACUTE UTI (URINARY TRACT INFECTION): Primary | ICD-10-CM

## 2023-09-21 PROCEDURE — 99207 PR NON-BILLABLE SERV PER CHARTING: CPT | Performed by: EMERGENCY MEDICINE

## 2023-09-21 RX ORDER — NITROFURANTOIN 25; 75 MG/1; MG/1
100 CAPSULE ORAL 2 TIMES DAILY
Qty: 10 CAPSULE | Refills: 0 | Status: SHIPPED | OUTPATIENT
Start: 2023-09-21 | End: 2023-09-26

## 2023-09-21 NOTE — PATIENT INSTRUCTIONS
Dear Vero Honeycutt    After reviewing your responses, I've been able to diagnose you with a urinary tract infection, which is a common infection of the bladder with bacteria.  This is not a sexually transmitted infection, though urinating immediately after intercourse can help prevent infections.  Drinking lots of fluids is also helpful to clear your current infection and prevent the next one.      I have sent a prescription for antibiotics to your pharmacy to treat this infection.    It is important that you take all of your prescribed medication even if your symptoms are improving after a few doses.  Taking all of your medicine helps prevent the symptoms from returning.     If your symptoms worsen, you develop pain in your back or stomach, develop fevers, or are not improving in 5 days, please contact your primary care provider for an appointment or visit any of our convenient Walk-in or Urgent Care Centers to be seen, which can be found on our website here.    Thanks again for choosing us as your health care partner,    Gareth Payne MD

## 2023-12-13 DIAGNOSIS — F40.240 CLAUSTROPHOBIA: ICD-10-CM

## 2023-12-13 RX ORDER — ALPRAZOLAM 1 MG
1 TABLET ORAL
Qty: 1 TABLET | Refills: 0 | Status: SHIPPED | OUTPATIENT
Start: 2023-12-13 | End: 2024-08-20

## 2023-12-28 ENCOUNTER — TELEPHONE (OUTPATIENT)
Dept: DERMATOLOGY | Facility: CLINIC | Age: 28
End: 2023-12-28
Payer: COMMERCIAL

## 2023-12-28 NOTE — TELEPHONE ENCOUNTER
Patient Contacted  and schedule the following:    Appointment type: return  Provider: Dr. Bess  Return date: 08/07/24  Specialty phone number: [phone]

## 2024-01-10 ENCOUNTER — ANCILLARY PROCEDURE (OUTPATIENT)
Dept: MRI IMAGING | Facility: CLINIC | Age: 29
End: 2024-01-10
Attending: CLINICAL NURSE SPECIALIST
Payer: COMMERCIAL

## 2024-01-10 DIAGNOSIS — Z80.3 FAMILY HISTORY OF MALIGNANT NEOPLASM OF BREAST: ICD-10-CM

## 2024-01-10 DIAGNOSIS — Z12.39 BREAST CANCER SCREENING, HIGH RISK PATIENT: ICD-10-CM

## 2024-01-10 DIAGNOSIS — R92.30 DENSE BREAST: ICD-10-CM

## 2024-01-10 PROCEDURE — A9585 GADOBUTROL INJECTION: HCPCS

## 2024-01-10 PROCEDURE — 77049 MRI BREAST C-+ W/CAD BI: CPT

## 2024-01-10 RX ORDER — GADOBUTROL 604.72 MG/ML
7.5 INJECTION INTRAVENOUS ONCE
Status: COMPLETED | OUTPATIENT
Start: 2024-01-10 | End: 2024-01-10

## 2024-01-10 RX ADMIN — GADOBUTROL 7.5 ML: 604.72 INJECTION INTRAVENOUS at 09:27

## 2024-01-24 ASSESSMENT — ENCOUNTER SYMPTOMS
COUGH: 0
CHILLS: 0
PALPITATIONS: 0
JOINT SWELLING: 0
HEMATURIA: 0
BREAST MASS: 0
ARTHRALGIAS: 0
SHORTNESS OF BREATH: 0
NAUSEA: 0
FREQUENCY: 0
SORE THROAT: 0
EYE PAIN: 0
HEMATOCHEZIA: 0
HEADACHES: 0
CONSTIPATION: 0
HEARTBURN: 0
DIZZINESS: 0
DYSURIA: 0
NERVOUS/ANXIOUS: 0
ABDOMINAL PAIN: 0
MYALGIAS: 0
DIARRHEA: 0
PARESTHESIAS: 0
FEVER: 0

## 2024-01-24 ASSESSMENT — ANXIETY QUESTIONNAIRES
GAD7 TOTAL SCORE: 1
GAD7 TOTAL SCORE: 1
1. FEELING NERVOUS, ANXIOUS, OR ON EDGE: SEVERAL DAYS
7. FEELING AFRAID AS IF SOMETHING AWFUL MIGHT HAPPEN: NOT AT ALL
7. FEELING AFRAID AS IF SOMETHING AWFUL MIGHT HAPPEN: NOT AT ALL
4. TROUBLE RELAXING: NOT AT ALL
8. IF YOU CHECKED OFF ANY PROBLEMS, HOW DIFFICULT HAVE THESE MADE IT FOR YOU TO DO YOUR WORK, TAKE CARE OF THINGS AT HOME, OR GET ALONG WITH OTHER PEOPLE?: NOT DIFFICULT AT ALL
6. BECOMING EASILY ANNOYED OR IRRITABLE: NOT AT ALL
IF YOU CHECKED OFF ANY PROBLEMS ON THIS QUESTIONNAIRE, HOW DIFFICULT HAVE THESE PROBLEMS MADE IT FOR YOU TO DO YOUR WORK, TAKE CARE OF THINGS AT HOME, OR GET ALONG WITH OTHER PEOPLE: NOT DIFFICULT AT ALL
3. WORRYING TOO MUCH ABOUT DIFFERENT THINGS: NOT AT ALL
5. BEING SO RESTLESS THAT IT IS HARD TO SIT STILL: NOT AT ALL
2. NOT BEING ABLE TO STOP OR CONTROL WORRYING: NOT AT ALL

## 2024-01-30 ENCOUNTER — OFFICE VISIT (OUTPATIENT)
Dept: OBGYN | Facility: CLINIC | Age: 29
End: 2024-01-30
Attending: ADVANCED PRACTICE MIDWIFE
Payer: COMMERCIAL

## 2024-01-30 VITALS — DIASTOLIC BLOOD PRESSURE: 79 MMHG | HEART RATE: 77 BPM | SYSTOLIC BLOOD PRESSURE: 120 MMHG

## 2024-01-30 DIAGNOSIS — Z97.5 IUD (INTRAUTERINE DEVICE) IN PLACE: ICD-10-CM

## 2024-01-30 DIAGNOSIS — Z00.00 VISIT FOR PREVENTIVE HEALTH EXAMINATION: Primary | ICD-10-CM

## 2024-01-30 DIAGNOSIS — Z80.3 FAMILY HISTORY OF MALIGNANT NEOPLASM OF BREAST: ICD-10-CM

## 2024-01-30 PROCEDURE — 99214 OFFICE O/P EST MOD 30 MIN: CPT | Performed by: ADVANCED PRACTICE MIDWIFE

## 2024-01-30 PROCEDURE — 99395 PREV VISIT EST AGE 18-39: CPT | Performed by: ADVANCED PRACTICE MIDWIFE

## 2024-01-30 ASSESSMENT — PAIN SCALES - GENERAL: PAINLEVEL: NO PAIN (0)

## 2024-01-30 NOTE — PATIENT INSTRUCTIONS
Thank you for trusting us with your care!     If you need to contact us for questions about:  Symptoms, Scheduling & Medical Questions; Non-urgent (2-3 day response) Ifeomayobany message, Urgent (needing response today) [phone] (if after 3:30pm next day response)   Prescriptions: Please call your Pharmacy   Billing: [facility] [phone] or [facility]:[phone]    PREVENTIVE HEALTH RECOMMENDATIONS:   Most women need a yearly breast and pelvic exam.    A PAP screen, a test done DURING a pelvic exam, is NO longer recommended yearly.    March 2013, screening guidelines recommended by ACOG for PAP screen are:    1) First pap at age 21.    2) Pap every 3 years until age 30.    3) After age 30, pap every 3 years or Pap with HR HPV screen every 5 years until age 65.  4) Women do NOT need a vaginal Pap screen after a hysterectomy (surgical removal of the uterus) when they have not had cancer.    Exceptions:  1) Yearly pap if HIV+ or immunosuppressed secondary to organ transplant  2) CATHY II-III continue routine screening for 20 years.    I encourage you continue looking for opportunities to choose a healthy lifestyle:       * Choose to eat a heart healthy diet. Check out the FOOD PLATE guidelines at: http://www.choosemyplate.gov/ for helpful hints on weight and cholesterol management.  Balance your caloric intake with exercise to maintain a BMI in the 22 to 26 range. For bone health: Eat calcium-rich foods like yogurt, broccoli or take chewable calcium pills (500 to 600 mg) twice a day with food.       * Exercise for at least an average of 30 minutes a day, 5 days of the week. This will help you control your weight, release stress, and help prevent disease.      * Take a Vitamin D3 supplement daily fall through spring and during summer unless you [id number]' full body sun exposure to skin without sunscreen.      * DO wear sunscreen to prevent skin cancer after the first 15-30 minutes.      * Identify stressors in your  life, find ways to release the stress, and, make time for yourself. PLEASE ask for help if mood changes last longer than two weeks.     * Limit alcohol to one drink per day.  No smoking.  Avoid second hand smoke. If you smoke, ask for help to stop.       *  If you are in a sexual relationship, talk with your partner about possible infection risks and take action to protect yourself from exposure to a sexual infection.    Please request an infection screen for STIs (sexually transmitted infections) if you are less than age 26 OR believe that you may be at risk.     Get a flu shot each year. Get a tetanus shot every 10 years. EVERYONE needs a pertussis (Whooping cough) booster.    See your dentist twice a year for an exam and preventive care cleaning.     Consider the following screen tests:    1) cholesterol test every 5 years.     2) yearly mammogram after age 40 unless you have identified risks.    3) colonoscopy every 10 years after age 50 unless you have identified risks.    4) diabetes blood test screening if you are at risk for diabetes.      Additional information that you may also find helpful:  The Internet now gives us access to LOTS of information -- some of it helpful, research documented and also plenty of harmful, anecdotal information that may not pertain to your situtaion. Consider visiting the following websites for accurate health information:    www.vitamindcouncil.org/ : Info and ongoing research re Vitamin D    www.fairview.org : Up to date and easily searchable information on multiple topics.    www.medlineplus.gov : medication info, interactive tutorials, watch real surgeries online    www.cdc.gov : public health info, travel advisories, epidemics (H1N1)    www.reid/std.org: current research re diagnosis, treatment and prevention of sexually contacted infections.    www.health.state.[place].us : [place] dept of heatlh, public health issues in [place], N1N1    www.familydoctor.org : good info from the Academy  of Family Physicians

## 2024-01-30 NOTE — LETTER
"2024       RE: Vero Honeycutt   [address]  [place] [address]     Dear Colleague,    Thank you for referring your patient, Vero Honeycutt, to the [facility] [place] at [facility]. Please see a copy of my visit note below.        Progress Note    SUBJECTIVE:  Vero Honeycutt is an 28 year old, , who requests an Annual Preventive Exam.       Concerns today include:   Happy w IUD x 5 yrs, no menses, has mirena, strings are not at os (US last year confirmed IUD in situ)  Has seen genetic counseling, qualifies for breast MRI/MAMMO q 6 months.  Father committed suicide at age 83 this fall in [place], was living with her brother. Family is grieving. Vero processing the loss, seeing therapist. Feels like she mostly understands her father's choice, but very sad.     Menstrual History:      2022     9:03 AM 2023     8:55 AM 2023     9:00 AM   Menstrual History   LAST MENSTRUAL PERIOD   1/15/2023   Menarche Age 12 years 13.5 years    Period Cycle (Days) random with iud random periods on iud    Period Duration (Days) 2 days 1-2    Method of Contraception Mirena IUD Mirena IUD    Period Pattern Irregular Irregular    Menstrual Flow Light Light    Dysmenorrhea Mild Mild    PMS Symptoms Cramping;Diarrhea Cramping;Mood Changes    Reviewed Today Yes Yes    Comments breast tenderness         Last  No results found for: \"PAP\"  History of abnormal Pap smear: NO - age 30- 65 PAP every 3 years recommended      Last Colonoscopy:  No results found for this or any previous visit.      HISTORY:  Prescription Medications as of 2024         Rx Number Disp Refills Start End Last Dispensed Date Next Fill Date Owning Pharmacy    levonorgestrel (MIRENA) 20 MCG/24HR IUD  -- -- 10/1/2019 --       Si each by Intrauterine route    Class: Historical    Route: Intrauterine    tretinoin (RETIN-A) 0.025 % external cream  45 g 11 2023 --   " [facility] #[id number] - [place], [place] - [address]    Sig: Apply pea-sized amount to face at bedtime. Start every other night for 1-2 weeks, then increase to nightly as tolerated.    Class: E-Prescribe    ALPRAZolam (XANAX) 1 MG tablet  1 tablet 0 2023 --   [facility]pharm[facility] #[id number] - [place], [place] - [address]    Sig: Take 1 tablet (1 mg) by mouth once as needed for anxiety (prior to breast MRI)    Class: E-Prescribe    Route: Oral          No Known Allergies  Immunization History   Administered Date(s) Administered    COVID-19 12+ () (MODERNA) 10/26/2023    COVID-19 Bivalent 12+ (Pfizer) 2022    COVID-19 MONOVALENT 12+ (Pfizer) 2021    HPV9 2018, 2018    Influenza Vaccine >6 months,quad, PF 2022    MMR 2015    Meningococcal (Menomune ) 10/14/2012    TD,PF 7+ (Tenivac) 08/15/2011    TDAP (Adacel,Boostrix) 2016    Td (Adult), Adsorbed 2005, 2011    Td,adult,historic,unspecified 2005, 08/15/2011, 2011       OB History    Para Term  AB Living   0 0 0 0 0 0   SAB IAB Ectopic Multiple Live Births   0 0 0 0 0     Past Medical History:   Diagnosis Date    Urinary tract infection I had about three in 2018, but the issue resolved     Past Surgical History:   Procedure Laterality Date    HC REDUCTION OF LARGE BREAST      Description: Breast Surgery Reduction Procedure;  Recorded: 2014;    MAMMOPLASTY REDUCTION BILATERAL      ORTHOPEDIC SURGERY  ACL reconstruction 2016    SD HYMENOTOMY, SIMPLE INCISION      Description: Hymenotomy;  Recorded: 2013;    WISDOM TOOTH EXTRACTION       Family History   Problem Relation Age of Onset    Breast Cancer Mother 40    Osteoporosis Mother         She has osteopenia    Colon Polyps Mother         2-5    Alcoholism Father     Anxiety Disorder Father     Depression Father     Suicide Father 83    Breast Cancer Maternal Grandmother 76    Pancreatic Cancer Maternal Grandmother  79         at 80    Cancer Maternal Grandfather         testicular    Prostate Cancer Maternal Grandfather 90         at 103    Genetic Disorder Paternal Uncle         BRCA2+; pt is negative    Asthma Niece     Breast Cancer Paternal Cousin 30        BRCA2+, pt is negative     Social History     Socioeconomic History    Marital status: Single    Number of children: 0   Occupational History    Occupation: Marketing business, [employer]     Employer: [employer] Work in group   Tobacco Use    Smoking status: Never    Smokeless tobacco: Never   Vaping Use    Vaping Use: Never used   Substance and Sexual Activity    Alcohol use: Yes     Alcohol/week: 1.7 standard drinks of alcohol    Drug use: No    Sexual activity: Yes     Partners: Male     Birth control/protection: I.U.D.   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 There is no height or weight on file to calculate BMI.  General - pleasant female in no acute distress.  Skin - no suspicious lesions or rashes  EENT-  PERRLA, euthyroid with out palpable nodules  Neck - supple without lymphadenopathy.  Lungs - clear to auscultation bilaterally.  Heart - regular rate and rhythm without murmur.  Abdomen - soft, nontender, nondistended, no masses or organomegaly noted.  Musculoskeletal - no gross deformities.  Neurological - normal strength, sensation, and mental status.    Breast Exam:  Breast: Without visible skin changes. No dimpling or lesions seen.   Breasts supple, non-tender with palpation, no dominant mass, nodularity, or nipple discharge noted bilaterally. Axillary nodes negative.  Scars from reduction surgery noted, pale and soft    Pelvic Exam:  EG/BUS: Normal genital architecture without lesions, erythema or abnormal secretions Bartholin's, Urethra, Lakewood's  normal   Urethral meatus: normal   Urethra: no masses, tenderness, or scarring   Bladder: no masses or tenderness   Vagina: moist, pink, rugae with creamy, white, and odorless  secretions  Cervix: Nulliparous, and firm, no CMT, strings not felt at os as on previous exams  Uterus: small, smooth, firm, mobile w/o pain  Adnexa: Within normal limits and No masses, nodularity, tenderness  Rectum:       ASSESSMENT:  Encounter Diagnoses   Name Primary?    Family history of malignant neoplasm of breast     IUD (intrauterine device) in place     Visit for preventive health examination Yes          PLAN:   No orders of the defined types were placed in this encounter.       Additional teaching done at this visit regarding self breast exam and birth control.    Return to clinic in one year.  Follow-up as needed.  Plan for fasting labs next year  Plans eye exam in the next 12 months  Mabel Dumont, DNP, APRN, CNM, FACNM      Answers submitted by the patient for this visit:  KRISTIN-7 (Submitted on 1/24/2024)  KRISTIN 7 TOTAL SCORE: 1  Annual Preventive Visit (Submitted on 1/24/2024)  Chief Complaint: Annual Exam:  Frequency of exercise:: 4-5 days/week  Getting at least 3 servings of Calcium per day:: Yes  Diet:: Regular (no restrictions)  Taking medications regularly:: Yes  Medication side effects:: Not applicable  Bi-annual eye exam:: NO  Dental care twice a year:: Yes  Sleep apnea or symptoms of sleep apnea:: None  Blood in stool: No  congestion: No  frequency: No  peripheral edema: No  mood changes: No  Skin sensation changes: No  pelvic pain: No  vaginal bleeding: No  tenderness: No  Additional concerns today:: No  Exercise outside of work (Submitted on 1/24/2024)  Chief Complaint: Annual Exam:  Duration of exercise:: 45-60 minutes

## 2024-01-30 NOTE — PROGRESS NOTES
"    Progress Note    SUBJECTIVE:  Vero Honeycutt is an 28 year old, , who requests an Annual Preventive Exam.       Concerns today include:   Happy w IUD x 5 yrs, no menses, has mirena, strings are not at os (US last year confirmed IUD in situ)  Has seen genetic counseling, qualifies for breast MRI/MAMMO q 6 months.  Father committed suicide at age 83 this fall in [place], was living with her brother. Family is grieving. Vero processing the loss, seeing therapist. Feels like she mostly understands her father's choice, but very sad.     Menstrual History:      2022     9:03 AM 2023     8:55 AM 2023     9:00 AM   Menstrual History   LAST MENSTRUAL PERIOD   1/15/2023   Menarche Age 12 years 13.5 years    Period Cycle (Days) random with iud random periods on iud    Period Duration (Days) 2 days 1-2    Method of Contraception Mirena IUD Mirena IUD    Period Pattern Irregular Irregular    Menstrual Flow Light Light    Dysmenorrhea Mild Mild    PMS Symptoms Cramping;Diarrhea Cramping;Mood Changes    Reviewed Today Yes Yes    Comments breast tenderness         Last  No results found for: \"PAP\"  History of abnormal Pap smear: NO - age 30- 65 PAP every 3 years recommended      Last Colonoscopy:  No results found for this or any previous visit.      HISTORY:  Prescription Medications as of 2024         Rx Number Disp Refills Start End Last Dispensed Date Next Fill Date Owning Pharmacy    levonorgestrel (MIRENA) 20 MCG/24HR IUD  -- -- 10/1/2019 --       Si each by Intrauterine route    Class: Historical    Route: Intrauterine    tretinoin (RETIN-A) 0.025 % external cream  45 g 11 2023 --   [facility]/pharmacy #[id number] - [place], [place] - [address]    Sig: Apply pea-sized amount to face at bedtime. Start every other night for 1-2 weeks, then increase to nightly as tolerated.    Class: E-Prescribe    ALPRAZolam (XANAX) 1 MG tablet  1 tablet 0 2023 --   [facility]/pharmacy #[id number] - [place], [place] - " [address]    Sig: Take 1 tablet (1 mg) by mouth once as needed for anxiety (prior to breast MRI)    Class: E-Prescribe    Route: Oral          No Known Allergies  Immunization History   Administered Date(s) Administered    COVID-19 12+ () (MODERNA) 10/26/2023    COVID-19 Bivalent 12+ (Pfizer) 2022    COVID-19 MONOVALENT 12+ (Pfizer) 2021    HPV9 2018, 2018    Influenza Vaccine >6 months,quad, PF 2022    MMR 2015    Meningococcal (Menomune ) 10/14/2012    TD,PF 7+ (Tenivac) 08/15/2011    TDAP (Adacel,Boostrix) 2016    Td (Adult), Adsorbed 2005, 2011    Td,adult,historic,unspecified 2005, 08/15/2011, 2011       OB History    Para Term  AB Living   0 0 0 0 0 0   SAB IAB Ectopic Multiple Live Births   0 0 0 0 0     Past Medical History:   Diagnosis Date    Urinary tract infection I had about three in 2018, but the issue resolved     Past Surgical History:   Procedure Laterality Date    HC REDUCTION OF LARGE BREAST      Description: Breast Surgery Reduction Procedure;  Recorded: 2014;    MAMMOPLASTY REDUCTION BILATERAL      ORTHOPEDIC SURGERY  ACL reconstruction 2016    MO HYMENOTOMY, SIMPLE INCISION      Description: Hymenotomy;  Recorded: 2013;    WISDOM TOOTH EXTRACTION       Family History   Problem Relation Age of Onset    Breast Cancer Mother 40    Osteoporosis Mother         She has osteopenia    Colon Polyps Mother         2-5    Alcoholism Father     Anxiety Disorder Father     Depression Father     Suicide Father 83    Breast Cancer Maternal Grandmother 76    Pancreatic Cancer Maternal Grandmother 79         at 80    Cancer Maternal Grandfather         testicular    Prostate Cancer Maternal Grandfather 90         at 103    Genetic Disorder Paternal Uncle         BRCA2+; pt is negative    Asthma Niece     Breast Cancer Paternal Cousin 30        BRCA2+, pt is negative     Social History      Socioeconomic History    Marital status: Single    Number of children: 0   Occupational History    Occupation: Marketing business, [employer]     Employer: [employer]   Tobacco Use    Smoking status: Never    Smokeless tobacco: Never   Vaping Use    Vaping Use: Never used   Substance and Sexual Activity    Alcohol use: Yes     Alcohol/week: 1.7 standard drinks of alcohol    Drug use: No    Sexual activity: Yes     Partners: Male     Birth control/protection: I.U.D.   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 There is no height or weight on file to calculate BMI.  General - pleasant female in no acute distress.  Skin - no suspicious lesions or rashes  EENT-  PERRLA, euthyroid with out palpable nodules  Neck - supple without lymphadenopathy.  Lungs - clear to auscultation bilaterally.  Heart - regular rate and rhythm without murmur.  Abdomen - soft, nontender, nondistended, no masses or organomegaly noted.  Musculoskeletal - no gross deformities.  Neurological - normal strength, sensation, and mental status.    Breast Exam:  Breast: Without visible skin changes. No dimpling or lesions seen.   Breasts supple, non-tender with palpation, no dominant mass, nodularity, or nipple discharge noted bilaterally. Axillary nodes negative.  Scars from reduction surgery noted, pale and soft    Pelvic Exam:  EG/BUS: Normal genital architecture without lesions, erythema or abnormal secretions Bartholin's, Urethra, Terry's normal   Urethral meatus: normal   Urethra: no masses, tenderness, or scarring   Bladder: no masses or tenderness   Vagina: moist, pink, rugae with creamy, white, and odorless  secretions  Cervix: Nulliparous, and firm, no CMT, strings not felt at os as on previous exams  Uterus: small, smooth, firm, mobile w/o  pain  Adnexa: Within normal limits and No masses, nodularity, tenderness  Rectum:       ASSESSMENT:  Encounter Diagnoses   Name Primary?    Family history of malignant neoplasm of breast     IUD (intrauterine device) in place     Visit for preventive health examination Yes          PLAN:   No orders of the defined types were placed in this encounter.       Additional teaching done at this visit regarding self breast exam and birth control.    Return to clinic in one year.  Follow-up as needed.  Plan for fasting labs next year  Plans eye exam in the next 12 months  Mabel Dumont, DNP, APRN, CNM, FACNM              Answers submitted by the patient for this visit:  KRISTIN-7 (Submitted on 1/24/2024)  KRISTIN 7 TOTAL SCORE: 1  Annual Preventive Visit (Submitted on 1/24/2024)  Chief Complaint: Annual Exam:  Frequency of exercise:: 4-5 days/week  Getting at least 3 servings of Calcium per day:: Yes  Diet:: Regular (no restrictions)  Taking medications regularly:: Yes  Medication side effects:: Not applicable  Bi-annual eye exam:: NO  Dental care twice a year:: Yes  Sleep apnea or symptoms of sleep apnea:: None  Blood in stool: No  congestion: No  frequency: No  peripheral edema: No  mood changes: No  Skin sensation changes: No  pelvic pain: No  vaginal bleeding: No  tenderness: No  Additional concerns today:: No  Exercise outside of work (Submitted on 1/24/2024)  Chief Complaint: Annual Exam:  Duration of exercise:: 45-60 minutes

## 2024-07-19 ENCOUNTER — OFFICE VISIT (OUTPATIENT)
Dept: FAMILY MEDICINE | Facility: CLINIC | Age: 29
End: 2024-07-19
Payer: COMMERCIAL

## 2024-07-19 VITALS
SYSTOLIC BLOOD PRESSURE: 135 MMHG | DIASTOLIC BLOOD PRESSURE: 81 MMHG | RESPIRATION RATE: 18 BRPM | HEART RATE: 79 BPM | OXYGEN SATURATION: 98 % | TEMPERATURE: 98.2 F

## 2024-07-19 DIAGNOSIS — J02.9 ACUTE SORE THROAT: ICD-10-CM

## 2024-07-19 DIAGNOSIS — H61.23 BILATERAL IMPACTED CERUMEN: Primary | ICD-10-CM

## 2024-07-19 ASSESSMENT — ENCOUNTER SYMPTOMS
COUGH: 0
SORE THROAT: 1
FEVER: 0
SHORTNESS OF BREATH: 0

## 2024-07-19 ASSESSMENT — PAIN SCALES - GENERAL: PAINLEVEL: NO PAIN (0)

## 2024-07-19 NOTE — PROGRESS NOTES
Today's Date: 2024     Patient Vero Honeycutt 1995 presents to the clinic today to address   Chief Complaint   Patient presents with    Ear Problem     Patient reports swimmers ear mostly in right ear              SUBJECTIVE     History of Present Illness:    28-year-old female presents to clinic with concerns for swimmers ear in R ear. Symptoms started this week after patient spent time swimming at her cabin up [place]. She denies ear pain but reports R ear fullness/sensation of fluid in her R ear canal. She denies fevers or chills. She endorses sore throat for the past 2 days that is worse in the morning. She tried OTC [employer] swimmer's ear drops which burned when administered. No other acute concerns/symptoms at time of exam.        Review of Systems   Constitutional:  Negative for fever.   HENT:  Positive for sore throat.    Respiratory:  Negative for cough and shortness of breath.    Cardiovascular:  Negative for chest pain.   Constitutional, HEENT, cardiovascular, pulmonary, gi and gu systems are negative, except as otherwise noted.      No Known Allergies     Current Outpatient Medications   Medication Instructions    ALPRAZolam (XANAX) 1 mg, Oral, ONCE PRN    levonorgestrel (MIRENA) 20 MCG/24HR IUD 1 each, Intrauterine    tretinoin (RETIN-A) 0.025 % external cream Apply pea-sized amount to face at bedtime. Start every other night for 1-2 weeks, then increase to nightly as tolerated.       Past Medical History:   Diagnosis Date    Urinary tract infection I had about three in 2018, but the issue resolved        Family History   Problem Relation Age of Onset    Breast Cancer Mother 40    Osteoporosis Mother         She has osteopenia    Colon Polyps Mother         2-5    Alcoholism Father     Anxiety Disorder Father     Depression Father     Suicide Father 83    Breast Cancer Maternal Grandmother 76    Pancreatic Cancer Maternal Grandmother 79         at 80    Cancer Maternal Grandfather          testicular    Prostate Cancer Maternal Grandfather 90         at 103    Genetic Disorder Paternal Uncle         BRCA2+; pt is negative    Asthma Niece     Breast Cancer Paternal Cousin 30        BRCA2+, pt is negative        Social History     Tobacco Use    Smoking status: Never    Smokeless tobacco: Never   Vaping Use    Vaping status: Never Used   Substance Use Topics    Alcohol use: Yes     Alcohol/week: 3.0 standard drinks of alcohol     Types: 3 Standard drinks or equivalent per week    Drug use: No        History   Sexual Activity    Sexual activity: Yes    Partners: Male    Birth control/ protection: I.U.D.             No data to display                 Immunization History   Administered Date(s) Administered    COVID-19 12+ () (MODERNA) 10/26/2023    COVID-19 Bivalent 12+ (Pfizer) 2022    COVID-19 MONOVALENT 12+ (Pfizer) 2021    HPV9 2018, 2018    Influenza Vaccine >6 months,quad, PF 2022    Influenza,INJ,MDCK,PF,Quad >6mo(Flucelvax) 10/26/2023    MMR 2015    Meningococcal (Menomune ) 10/14/2012    TD,PF 7+ (Tenivac) 08/15/2011    TDAP (Adacel,Boostrix) 2016    Td (Adult), Adsorbed 2005, 2011    Td,adult,historic,unspecified 2005, 08/15/2011, 2011                 OBJECTIVE     /81 (BP Location: Left arm, Patient Position: Sitting, Cuff Size: Adult Regular)   Pulse 79   Temp 98.2  F (36.8  C) (Oral)   Resp 18   SpO2 98%      Labs:  Lab Results   Component Value Date    WBC 5.8 2022    HGB 13.6 2022    HCT 40.0 2022     2022    CHOL 181 2016    TRIG 61 2016    HDL 84 2016    ALT 24 2022    AST 9 2022     2022    BUN 15 2022    CO2 25 2022        Physical Exam  Constitutional:       General: She is not in acute distress.     Appearance: Normal appearance. She is not ill-appearing.   HENT:      Right Ear: No tenderness. There is impacted  cerumen.      Left Ear: No tenderness. There is impacted cerumen.      Ears:      Comments: Mild amount of cerumen removed manually in Distal bilateral EACs with oval curette. Patient tolerated well without complication. There was still cerumen noted bilaterally following manual removal. Unable to visualize Tms or full EACs.  No tragal tenderness bilaterally.      Nose: No rhinorrhea.      Mouth/Throat:      Pharynx: Oropharynx is clear. No oropharyngeal exudate.   Eyes:      Extraocular Movements: Extraocular movements intact.      Pupils: Pupils are equal, round, and reactive to light.   Cardiovascular:      Rate and Rhythm: Normal rate and regular rhythm.      Heart sounds: Normal heart sounds. No murmur heard.  Pulmonary:      Effort: Pulmonary effort is normal. No respiratory distress.      Breath sounds: Normal breath sounds. No wheezing or rales.   Musculoskeletal:      Cervical back: Neck supple.      Right lower leg: No edema.      Left lower leg: No edema.   Lymphadenopathy:      Cervical: No cervical adenopathy.   Neurological:      General: No focal deficit present.      Mental Status: She is alert.   Psychiatric:         Thought Content: Thought content normal.         Judgment: Judgment normal.               ASSESSMENT/PLAN     1. Bilateral impacted cerumen  As noted in HPI. Pt is afebrile and without tragal tenderness. Unable to visualize EACs and Tms after manually removing cerumen in distal EACs with oval curette. Since patient reported pain with OTC swimmer's ear drops, we decided to hold off on ear lavage today. Instructed pt to discontinue OTC gtts. Will place priority referral to ENT for suction cleaning to fully visualize EACs/Tms bilaterally. Advised pt that we will hold off on ciprodex gtts until we can fully evaluate EACs/Tms (michelle in the absence of fevers, tragal tenderness, etc.)  - Adult ENT  Referral; Future    2. Acute sore throat  Afebrile pt, oropharynx without  erythema/exudate. Offered strep test which pt respectfully declined. Advised home COVID test and monitoring at this time.          Follow-Up:  - Follow up in as needed, or if symptoms worsen or fail to improve.     Options for treatment and follow-up care were reviewed with the patient. Patient engaged in the decision making process and verbalized understanding of the options discussed and agreed with the final plan.  AVS printed and given to patient.    RACHEAL Sampson [facility]  [address] [place] [id number] [phone]        Note: Chart documentation was done in part with Dragon Voice Recognition software.  Although reviewed after completion, some word and grammatical errors may remain. Please contact author for any clarification or concerns.

## 2024-07-19 NOTE — NURSING NOTE
ROOM:  [facility]    Preferred Name: Vero     How did you hear about us?  Other - Referred by another clinic     Services Provided? No    28 year old  Chief Complaint   Patient presents with    Ear Problem     Patient reports swimmers ear mostly in right ear        Blood pressure 135/81, pulse 79, temperature 98.2  F (36.8  C), temperature source Oral, resp. rate 18, SpO2 98%. There is no height or weight on file to calculate BMI.  BP completed using cuff size:        Patient Active Problem List   Diagnosis    Family history of malignant neoplasm of breast    IUD (intrauterine device) in place       Wt Readings from Last 2 Encounters:   07/19/23 77.1 kg (170 lb)   02/16/23 80.3 kg (177 lb)     BP Readings from Last 3 Encounters:   07/19/24 135/81   01/30/24 120/79   02/16/23 132/84       No Known Allergies    Current Outpatient Medications   Medication Sig Dispense Refill    levonorgestrel (MIRENA) 20 MCG/24HR IUD 1 each by Intrauterine route      tretinoin (RETIN-A) 0.025 % external cream Apply pea-sized amount to face at bedtime. Start every other night for 1-2 weeks, then increase to nightly as tolerated. 45 g 11    ALPRAZolam (XANAX) 1 MG tablet Take 1 tablet (1 mg) by mouth once as needed for anxiety (prior to breast MRI) (Patient not taking: Reported on 1/30/2024) 1 tablet 0     No current facility-administered medications for this visit.       Social History     Tobacco Use    Smoking status: Never    Smokeless tobacco: Never   Vaping Use    Vaping status: Never Used   Substance Use Topics    Alcohol use: Yes     Alcohol/week: 3.0 standard drinks of alcohol     Types: 3 Standard drinks or equivalent per week    Drug use: No       Honoring Choices - Health Care Directive Guide offered to patient at time of visit.    Health Maintenance Due   Topic Date Due    ADVANCE CARE PLANNING  Never done    HEPATITIS B IMMUNIZATION (1 of 3 - 19+ 3-dose series) Never done    HPV IMMUNIZATION (3 - 3-dose  "series) 02/07/2019       Immunization History   Administered Date(s) Administered    COVID-19 12+ (2023-24) (MODERNA) 10/26/2023    COVID-19 Bivalent 12+ (Pfizer) 11/02/2022    COVID-19 MONOVALENT 12+ (Pfizer) 12/02/2021    HPV9 08/07/2018, 09/12/2018    Influenza Vaccine >6 months,quad, PF 12/02/2022    Influenza,INJ,MDCK,PF,Quad >6mo(Flucelvax) 10/26/2023    MMR 03/16/2015    Meningococcal (Menomune ) 10/14/2012    TD,PF 7+ (Tenivac) 08/15/2011    TDAP (Adacel,Boostrix) 12/23/2016    Td (Adult), Adsorbed 08/29/2005, 08/22/2011    Td,adult,historic,unspecified 08/29/2005, 08/15/2011, 08/22/2011       No results found for: \"PAP\"    Recent Labs   Lab Test 02/14/22  1038 12/23/16  1154   A1C 5.1  --    LDL  --  85   HDL  --  84   TRIG  --  61   ALT 24  --    CR 0.58  --    GFRESTIMATED >90  --    ALBUMIN 3.8  --    POTASSIUM 3.9  --            7/18/2024    10:28 AM 1/30/2024     9:30 AM   PHQ-2 ( 1999 Pfizer)   Q1: Little interest or pleasure in doing things 0 0   Q2: Feeling down, depressed or hopeless 0 1   PHQ-2 Score 0 1   Q1: Little interest or pleasure in doing things Not at all    Q2: Feeling down, depressed or hopeless Not at all    PHQ-2 Score 0             No data to display                    2/7/2022    11:25 AM 2/14/2023     8:39 AM 1/24/2024     2:21 PM   KRISTIN-7 SCORE   Total Score 1 (minimal anxiety) 0 (minimal anxiety) 1 (minimal anxiety)   Total Score 1 0 1            No data to display                Frandy Jaime    July 19, 2024 8:13 AM   "

## 2024-08-07 ENCOUNTER — OFFICE VISIT (OUTPATIENT)
Dept: DERMATOLOGY | Facility: CLINIC | Age: 29
End: 2024-08-07
Payer: COMMERCIAL

## 2024-08-07 DIAGNOSIS — Z12.83 SKIN CANCER SCREENING: Primary | ICD-10-CM

## 2024-08-07 DIAGNOSIS — D18.01 CHERRY ANGIOMA: ICD-10-CM

## 2024-08-07 DIAGNOSIS — L81.4 SOLAR LENTIGO: ICD-10-CM

## 2024-08-07 DIAGNOSIS — D22.9 MULTIPLE BENIGN NEVI: ICD-10-CM

## 2024-08-07 DIAGNOSIS — L70.0 ACNE VULGARIS: ICD-10-CM

## 2024-08-07 PROCEDURE — 99214 OFFICE O/P EST MOD 30 MIN: CPT | Performed by: DERMATOLOGY

## 2024-08-07 RX ORDER — TRETINOIN 0.25 MG/G
CREAM TOPICAL
Qty: 45 G | Refills: 11 | Status: SHIPPED | OUTPATIENT
Start: 2024-08-07

## 2024-08-07 RX ORDER — CLINDAMYCIN PHOSPHATE 10 UG/ML
LOTION TOPICAL 2 TIMES DAILY
Qty: 60 ML | Refills: 11 | Status: SHIPPED | OUTPATIENT
Start: 2024-08-07

## 2024-08-07 ASSESSMENT — PAIN SCALES - GENERAL: PAINLEVEL: NO PAIN (0)

## 2024-08-07 NOTE — PROGRESS NOTES
[facility] Dermatology Note  Encounter Date: Aug 7, 2024  Office Visit     Dermatology Problem List:  1. Acne Vulgaris  - current tx: Tretinoin 0.025% cream  2. Involuted/resolved hemangioma, R upper cheek.  3. Hx of Melanocytic Nevus, L lower back, s/p shave bx 07/28/2023    # Soc hx: getting  next September!  ____________________________________________    Assessment & Plan:     # Acne Vulgaris - chronic, active, doing ok but still some outbreaks.  - continue tretinoin 0.025% cream at bedtime  - start clindamycin lotion BID prn for inflammatory lesions  - can use anti-acne wash such as benzoyl peroxide wash or sal acid or hibiclens  - has IUD, query hormonal  - advised if has outbreak around wedding, could message me for 2-3 days of prednisone    # Multiple benign nevi.   - Monitor for ABCDEs of melanoma   - Continue sun protection - recommend SPF 30 or higher with frequent application   - Return sooner if noticing changing or symptomatic lesions    # Benign lesions - cherry angiomas, lentigenes.  - No treatment required      Procedures Performed:     Follow-up: 1 year, in person, or sooner for new or changing lesions    Staff and Scribe:   Scribe Disclosure:   By signing my name below, I, Ellen Lucas, attest that this documentation has been prepared under the direction and in the presence of Sayda Bess MD.  - Electronically Signed: Ellen Zavala 08/07/24       Provider Disclosure:   The documentation recorded by the scribe accurately reflects the services I personally performed and the decisions made by me.    Sayda Bess MD    Department of Dermatology  [facility]: Phone: [phone], Fax: [phone]  8/13/2024       ____________________________________________    CC: Skin Check (Here today for skin check. No concerns. )    HPI:  Ms. Vero Honeycutt is a(n) 28 year old female who  presents today as a return patient for a skin check.    Patient is getting  next September and is wanting to keep up maintenance with her skin care. Patient reports hx of slightly cystic acne that would appear intermittently. She has an IUD so is unable to track if there is an association to her menses.    Patient is otherwise feeling well, without additional skin concerns.     Labs Reviewed:  Dermatopathology from 07/28/2023  Final Diagnosis   A. Left lower back:  - Predominantly intradermal melanocytic nevus - (see description)       Physical exam:  Vitals: There were no vitals taken for this visit.  GEN: This is a well developed, well-nourished female in no acute distress, in a pleasant mood.    SKIN: Total skin excluding the undergarment areas was performed. The exam included the head/face, neck, both arms, chest, back, abdomen, both legs, digits and/or nails.   - acneiform papule on L cheek and R jaw line  - There are dome shaped bright red papules on the head/neck, trunk, extremities.   - Multiple regular brown pigmented macules and papules are identified on the head/neck, trunk, extremities.   - Scattered brown macules on sun exposed areas.  - No other lesions of concern on areas examined.       Medications:  Current Outpatient Medications   Medication Sig Dispense Refill    levonorgestrel (MIRENA) 20 MCG/24HR IUD 1 each by Intrauterine route      tretinoin (RETIN-A) 0.025 % external cream Apply pea-sized amount to face at bedtime. Start every other night for 1-2 weeks, then increase to nightly as tolerated. 45 g 11    ALPRAZolam (XANAX) 1 MG tablet Take 1 tablet (1 mg) by mouth once as needed for anxiety (prior to breast MRI) (Patient not taking: Reported on 1/30/2024) 1 tablet 0     No current facility-administered medications for this visit.      Past Medical History:   Patient Active Problem List   Diagnosis    Family history of malignant neoplasm of breast    IUD (intrauterine device) in place      Past Medical History:   Diagnosis Date    Urinary tract infection I had about three in 2018, but the issue resolved       CC Mabel Dumont, APRN CNM  [address] [address] on close of this encounter.

## 2024-08-07 NOTE — NURSING NOTE
Dermatology Rooming Note    Vero Honeycutt's goals for this visit include:   Chief Complaint   Patient presents with    Skin Check     Here today for skin check. No concerns.      Linda Duran RN

## 2024-08-07 NOTE — LETTER
8/7/2024       RE: Vero Honeycutt  [address]  [place] [address]     Dear Colleague,    Thank you for referring your patient, Vero Honeycutt, to the [facility] [place] at [facility]. Please see a copy of my visit note below.    [facility] Dermatology Note  Encounter Date: Aug 7, 2024  Office Visit     Dermatology Problem List:  1. Acne Vulgaris  - current tx: Tretinoin 0.025% cream  2. Involuted/resolved hemangioma, R upper cheek.  3. Hx of Melanocytic Nevus, L lower back, s/p shave bx 07/28/2023    # Soc hx: getting  next September!  ____________________________________________    Assessment & Plan:     # Acne Vulgaris - chronic, active, doing ok but still some outbreaks.  - continue tretinoin 0.025% cream at bedtime  - start clindamycin lotion BID prn for inflammatory lesions  - can use anti-acne wash such as benzoyl peroxide wash or sal acid or hibiclens  - has IUD, query hormonal  - advised if has outbreak around wedding, could message me for 2-3 days of prednisone    # Multiple benign nevi.   - Monitor for ABCDEs of melanoma   - Continue sun protection - recommend SPF 30 or higher with frequent application   - Return sooner if noticing changing or symptomatic lesions    # Benign lesions - cherry angiomas, lentigenes.  - No treatment required      Procedures Performed:     Follow-up: 1 year, in person, or sooner for new or changing lesions    Staff and Scribe:   Scribe Disclosure:   By signing my name below, I, Ellen Zavala, attest that this documentation has been prepared under the direction and in the presence of Sayda Bess MD.  - Electronically Signed: Ellen Zavala 08/07/24       Provider Disclosure:   The documentation recorded by the scribe accurately reflects the services I personally performed and the decisions made by me.    Sayda Bess MD    Department of  Dermatology  [facility]: Phone: [phone], Fax: [phone]  8/13/2024       ____________________________________________    CC: Skin Check (Here today for skin check. No concerns. )    HPI:  Ms. Vero Honeycutt is a(n) 28 year old female who presents today as a return patient for a skin check.    Patient is getting  next September and is wanting to keep up maintenance with her skin care. Patient reports hx of slightly cystic acne that would appear intermittently. She has an IUD so is unable to track if there is an association to her menses.    Patient is otherwise feeling well, without additional skin concerns.     Labs Reviewed:  Dermatopathology from 07/28/2023  Final Diagnosis   A. Left lower back:  - Predominantly intradermal melanocytic nevus - (see description)       Physical exam:  Vitals: There were no vitals taken for this visit.  GEN: This is a well developed, well-nourished female in no acute distress, in a pleasant mood.    SKIN: Total skin excluding the undergarment areas was performed. The exam included the head/face, neck, both arms, chest, back, abdomen, both legs, digits and/or nails.   - acneiform papule on L cheek and R jaw line  - There are dome shaped bright red papules on the head/neck, trunk, extremities.   - Multiple regular brown pigmented macules and papules are identified on the head/neck, trunk, extremities.   - Scattered brown macules on sun exposed areas.  - No other lesions of concern on areas examined.       Medications:  Current Outpatient Medications   Medication Sig Dispense Refill     levonorgestrel (MIRENA) 20 MCG/24HR IUD 1 each by Intrauterine route       tretinoin (RETIN-A) 0.025 % external cream Apply pea-sized amount to face at bedtime. Start every other night for 1-2 weeks, then increase to nightly as tolerated. 45 g 11     ALPRAZolam (XANAX) 1 MG tablet Take 1 tablet (1 mg) by mouth once  as needed for anxiety (prior to breast MRI) (Patient not taking: Reported on 1/30/2024) 1 tablet 0     No current facility-administered medications for this visit.      Past Medical History:   Patient Active Problem List   Diagnosis     Family history of malignant neoplasm of breast     IUD (intrauterine device) in place     Past Medical History:   Diagnosis Date     Urinary tract infection I had about three in 2018, but the issue resolved       CC Mabel Dumont, APRN CNM  [address] [facility] [address]  [place] [address] on close of this encounter.      Again, thank you for allowing me to participate in the care of your patient.      Sincerely,    Sayda Bess MD

## 2024-08-07 NOTE — PATIENT INSTRUCTIONS

## 2024-08-16 ENCOUNTER — TELEPHONE (OUTPATIENT)
Dept: DERMATOLOGY | Facility: CLINIC | Age: 29
End: 2024-08-16
Payer: COMMERCIAL

## 2024-08-16 NOTE — TELEPHONE ENCOUNTER
Patient confirmed scheduled appointment:     Date: 8/27/25  Time: 8:30 AM   Visit type: Return dermatology  Provider: Dr. Sayda Bess  Location: [facility]    Additional Notes:  Unable to attach request, as appt request date is set equal to the appt request expiration date.

## 2024-08-16 NOTE — PROGRESS NOTES
Oncology Risk Management Consultation:  Date on this visit: 2024    Vero Honeycutt returns to the Cancer Risk Management Program for an oncology risk management consultation. She requires high risk screening and surveillance to reduce her risk of cancer secondary to having a family history of breast cancer in her mother at 40. She is considered to be at high risk for breast cancer and has a 26.3% lifetime risk for breast cancer by the PATRICIO model.      Primary Physician: Mabel Dumont CNM     History Of Present Illness:  Ms. Honeycutt is a very pleasant, healthy 29 year old female who presents with a family history of breast cancer.     Pertinent history:  Menarche at: 12  Nulliparous  Menopausal status: premenopausal  Ovaries, fallopian tubes and uterus intact  Breast Density: heterogeneously dense  Currently using Mirena IUD  Hx of breast reduction in   Hx of breast biopsies: none  Hx of atypia or malignancy.      Genetic testin23 -- Genetic Testing Result: NEGATIVE  Vero is negative for mutations in the 36 genes analyzed: APC, REYNA, BARD1, BMPR1A, BRCA1, BRCA2, BRIP1, CDH1, CDK4, CDKN2A, CHEK2, DICER1, MLH1, MSH2, MSH6, MUTYH, NBN, NF1, NTHL1, PALB2, PMS2, PTEN, RAD51C, RAD51D, RECQL, SMAD4, SMARCA4, STK11 and TP53 (sequencing and deletion/duplication); AXIN2, HOXB13, MSH3, POLD1 and POLE (sequencing only); EPCAM and GREM1 (deletion/duplication only) the CancerNext Panel from [employer].      Pertinent screening history:  2023: Baseline screening tomosynthesis mammogram, BiRads2  1/10/2024: Breast MRI, BiRads1      At this visit, she denies new fatigue, breast pain, asymmetry, lumps, masses, thickening, nipple discharge and skin changes in her breasts.    Past Medical/Surgical History:  Past Medical History:   Diagnosis Date    Urinary tract infection I had about three in 2018, but the issue resolved     Past Surgical History:   Procedure Laterality Date    HC REDUCTION OF LARGE  BREAST      Description: Breast Surgery Reduction Procedure;  Recorded: 2014;    MAMMOPLASTY REDUCTION BILATERAL      ORTHOPEDIC SURGERY  ACL reconstruction 2016    MO HYMENOTOMY, SIMPLE INCISION      Description: Hymenotomy;  Recorded: 2013;    WISDOM TOOTH EXTRACTION         Allergies:  Allergies as of 2024    (No Known Allergies)       Current Medications:  Current Outpatient Medications   Medication Sig Dispense Refill    ALPRAZolam (XANAX) 1 MG tablet Take 1 tablet (1 mg) by mouth once as needed for anxiety (prior to breast MRI) (Patient not taking: Reported on 2024) 1 tablet 0    clindamycin (CLEOCIN T) 1 % external lotion Apply topically 2 times daily To face as needed 60 mL 11    levonorgestrel (MIRENA) 20 MCG/24HR IUD 1 each by Intrauterine route      tretinoin (RETIN-A) 0.025 % external cream Apply pea-sized amount to face at bedtime. Start every other night for 1-2 weeks, then increase to nightly as tolerated. 45 g 11        Family History:  Family History   Problem Relation Age of Onset    Breast Cancer Mother 40    Osteoporosis Mother         She has osteopenia    Colon Polyps Mother         2-5    Alcoholism Father     Anxiety Disorder Father     Depression Father     Suicide Father 83    Breast Cancer Maternal Grandmother 76    Pancreatic Cancer Maternal Grandmother 79         at 80    Cancer Maternal Grandfather         testicular    Prostate Cancer Maternal Grandfather 90         at 103    Genetic Disorder Paternal Uncle         BRCA2+; pt is negative    Asthma Niece     Breast Cancer Paternal Cousin 30        BRCA2+, pt is negative       Social History:  Social History     Socioeconomic History    Marital status: Single     Spouse name: Federico    Number of children: 0    Years of education: Not on file    Highest education level: Not on file   Occupational History    Occupation: Marketing business, online     Employer: [employer]   Tobacco Use    Smoking  status: Never    Smokeless tobacco: Never   Vaping Use    Vaping status: Never Used   Substance and Sexual Activity    Alcohol use: Yes     Alcohol/week: 3.0 standard drinks of alcohol     Types: 3 Standard drinks or equivalent per week    Drug use: No    Sexual activity: Yes     Partners: Male     Birth control/protection: I.U.D.   Other Topics Concern    Parent/sibling w/ CABG, MI or angioplasty before 65F 55M? 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      Social Determinants of Health     Financial Resource Strain: Low Risk  (7/18/2024)    Financial Resource Strain     Within the past 12 months, have you or your family members you live with been unable to get utilities (heat, electricity) when it was really needed?: No   Food Insecurity: Low Risk  (7/18/2024)    Food Insecurity     Within the past 12 months, did you worry that your food would run out before you got money to buy more?: No     Within the past 12 months, did the food you bought just not last and you didn t have money to get more?: No   Transportation Needs: Low Risk  (7/18/2024)    Transportation Needs     Within the past 12 months, has lack of transportation kept you from medical appointments, getting your medicines, non-medical meetings or appointments, work, or from getting things that you need?: No   Physical Activity: Not on File (12/2/2021)    Received from [facility]    Physical Activity     Physical Activity: 0   Stress: No Stress Concern Present (7/19/2024)    [employer] [place] [employer] - Occupational Stress Questionnaire     Feeling of Stress : Only a little   Social Connections: Not on File (12/2/2021)    Received from [facility]    Social Connections     Social Connections and Isolation: 0   Interpersonal Safety: Not on file   Housing Stability: Low Risk  (7/18/2024)    Housing Stability     Do you have  housing? : Yes     Are you worried about losing your housing?: No       Physical Exam:  /85 (BP Location: Right arm, Patient Position: Sitting, Cuff Size: Adult Regular)   Pulse 84   Temp 97.7  F (36.5  C) (Oral)   Resp 16   Wt 81.8 kg (180 lb 4.8 oz)   SpO2 97%   BMI 30.00 kg/m    GENERAL APPEARANCE: healthy, alert and no distress  BREAST: A multipositional, bilateral breast exam was performed.  Fairly symmetrical. Nipples everted bilaterally. Bilateral, well healed scars from prior breast reduction surgery. Right breast: no palpable dominant masses, no nipple discharge, no skin changes. Dense tissue.  Right axilla: no palpable adenopathy. Left breast: no palpable dominant masses, no nipple discharge, no skin changes. Left axilla: no palpable adenopathy. Dense tissue.    LYMPHATICS: No cervical, supraclavicular, or axillary lymphadenopathy    Laboratory/Imaging Studies  No results found for any visits on 08/20/24.    ASSESSMENT    Vero states that she is doing well at this visit. She has no updates to her family's medical history, and no concerns with her breast tissue. We discussed her last screening tests and reviewed results.  We discussed concerns and symptoms to be watchful for between visits, including breast lumps, bumps, nipple inversion, nipple discharge and any changes in skin including crinkled or puckered skin. We discussed the recommendation for breast self awareness. We will proceed with the plan below.       Individualized Surveillance Plan for women  With 20% or greater lifetime risk of breast cancer   Per NCCN Breast Cancer Screening and Diagnosis Guidelines Version 2.2024   Recommended screening Test or procedure Last done Next Scheduled    Clinical encounter Clinical exam every 6-12 months.   Refer to genetic counseling if not already done.  Consider risk reduction strategies.   August 2024 August 2025   However, some family histories with breast cancers at a very young age, may  warrant screening starting earlier.    *May begin at age 40 if breast cancers in the family occur at later ages.    Annual mammogram beginning 10 years younger than the earliest breast cancer in the family but not prior to age 30.    Recommend annual breast MRI to begin 10 years younger than the earliest breast cancer in the family but not prior to age 25.    Breast MRIs are preferably done on day 7-15 of the menstrual cycle in premenopausal women.   8/21/2023: Baseline screening tomosynthesis mammogram, BiRads2    1/10/2024: Breast MUNDO, BiRads1    Mammogram today    Breast MRI in February 2025    Return to clinic in August, 2025 with a mammogram following our visit   Breast screening for patients at high risk due to thoracic radiation between the ages of 10-30   Annual clinical exam beginning 8 years after radiation therapy.    Annual screening mammogram beginning at age 30 or 8 years after radiation therapy    Annual breast MRI, beginning at age 25 or 8 years after radiation therapy.     NA   NA   Women who have a lifetime risk of >20% based on history of LCIS or ADH/ALH Annual screening mammogram beginning at age of LCIS or ADH/ALH but not prior to age 30.    Consider annual MRI to begin at age of diagnosis of LCIS or ADH/ALH but not prior to age 25.    Consider risk reducing strategies.   NA   NA    Recommend risk reducing strategies for women with 1.7% 5 year risk of breast cancer.         I spent a total of 19 minutes on the day of the visit. Please see the note for further information on patient assessment and treatment.     Obdulia Christensen, JUSTINE, APRN, AGCNS-BC  Clinical Nurse Specialist  Cancer Risk Management Program  [facility]    Cc:  Mabel Dumont CNM

## 2024-08-20 ENCOUNTER — ONCOLOGY VISIT (OUTPATIENT)
Dept: ONCOLOGY | Facility: CLINIC | Age: 29
End: 2024-08-20
Payer: COMMERCIAL

## 2024-08-20 ENCOUNTER — ANCILLARY PROCEDURE (OUTPATIENT)
Dept: MAMMOGRAPHY | Facility: CLINIC | Age: 29
End: 2024-08-20
Attending: CLINICAL NURSE SPECIALIST
Payer: COMMERCIAL

## 2024-08-20 VITALS
SYSTOLIC BLOOD PRESSURE: 124 MMHG | TEMPERATURE: 97.7 F | DIASTOLIC BLOOD PRESSURE: 85 MMHG | BODY MASS INDEX: 30 KG/M2 | WEIGHT: 180.3 LBS | OXYGEN SATURATION: 97 % | RESPIRATION RATE: 16 BRPM | HEART RATE: 84 BPM

## 2024-08-20 DIAGNOSIS — Z80.3 FAMILY HISTORY OF MALIGNANT NEOPLASM OF BREAST: ICD-10-CM

## 2024-08-20 DIAGNOSIS — R92.30 DENSE BREAST: ICD-10-CM

## 2024-08-20 DIAGNOSIS — Z12.39 BREAST CANCER SCREENING, HIGH RISK PATIENT: Primary | ICD-10-CM

## 2024-08-20 DIAGNOSIS — F40.240 CLAUSTROPHOBIA: ICD-10-CM

## 2024-08-20 DIAGNOSIS — Z12.39 BREAST CANCER SCREENING, HIGH RISK PATIENT: ICD-10-CM

## 2024-08-20 PROCEDURE — 77067 SCR MAMMO BI INCL CAD: CPT | Mod: GC

## 2024-08-20 PROCEDURE — 99213 OFFICE O/P EST LOW 20 MIN: CPT

## 2024-08-20 PROCEDURE — 77063 BREAST TOMOSYNTHESIS BI: CPT | Mod: GC

## 2024-08-20 PROCEDURE — 99202 OFFICE O/P NEW SF 15 MIN: CPT

## 2024-08-20 RX ORDER — ALPRAZOLAM 1 MG
1 TABLET ORAL
Qty: 1 TABLET | Refills: 0 | Status: SHIPPED | OUTPATIENT
Start: 2024-08-20

## 2024-08-20 ASSESSMENT — PAIN SCALES - GENERAL: PAINLEVEL: NO PAIN (0)

## 2024-08-20 NOTE — LETTER
2024      Vero Honeycutt  [address]  [place] [address]      Dear Colleague,    Thank you for referring your patient, Vero Honeycutt, to the [facility]. Please see a copy of my visit note below.    Oncology Risk Management Consultation:  Date on this visit: 2024    Vero Honeycutt returns to the Cancer Risk Management Program for an oncology risk management consultation. She requires high risk screening and surveillance to reduce her risk of cancer secondary to having a family history of breast cancer in her mother at 40. She is considered to be at high risk for breast cancer and has a 26.3% lifetime risk for breast cancer by the PATRICIO model.      Primary Physician: Mabel Dumont CNM     History Of Present Illness:  Ms. Honeycutt is a very pleasant, healthy 29 year old female who presents with a family history of breast cancer.     Pertinent history:  Menarche at: 12  Nulliparous  Menopausal status: premenopausal  Ovaries, fallopian tubes and uterus intact  Breast Density: heterogeneously dense  Currently using Mirena IUD  Hx of breast reduction in   Hx of breast biopsies: none  Hx of atypia or malignancy.      Genetic testin23 -- Genetic Testing Result: NEGATIVE  Vero is negative for mutations in the 36 genes analyzed: APC, REYNA, BARD1, BMPR1A, BRCA1, BRCA2, BRIP1, CDH1, CDK4, CDKN2A, CHEK2, DICER1, MLH1, MSH2, MSH6, MUTYH, NBN, NF1, NTHL1, PALB2, PMS2, PTEN, RAD51C, RAD51D, RECQL, SMAD4, SMARCA4, STK11 and TP53 (sequencing and deletion/duplication); AXIN2, HOXB13, MSH3, POLD1 and POLE (sequencing only); EPCAM and GREM1 (deletion/duplication only) the CancerNext Panel from [employer].      Pertinent screening history:  2023: Baseline screening tomosynthesis mammogram, BiRads2  1/10/2024: Breast MRI, BiRads1      At this visit, she denies new fatigue, breast pain, asymmetry, lumps, masses, thickening, nipple discharge and skin changes in her  breasts.    Past Medical/Surgical History:  Past Medical History:   Diagnosis Date     Urinary tract infection I had about three in 2018, but the issue resolved     Past Surgical History:   Procedure Laterality Date     HC REDUCTION OF LARGE BREAST      Description: Breast Surgery Reduction Procedure;  Recorded: 2014;     MAMMOPLASTY REDUCTION BILATERAL       ORTHOPEDIC SURGERY  ACL reconstruction 2016     SD HYMENOTOMY, SIMPLE INCISION      Description: Hymenotomy;  Recorded: 2013;     WISDOM TOOTH EXTRACTION         Allergies:  Allergies as of 2024     (No Known Allergies)       Current Medications:  Current Outpatient Medications   Medication Sig Dispense Refill     ALPRAZolam (XANAX) 1 MG tablet Take 1 tablet (1 mg) by mouth once as needed for anxiety (prior to breast MRI) (Patient not taking: Reported on 2024) 1 tablet 0     clindamycin (CLEOCIN T) 1 % external lotion Apply topically 2 times daily To face as needed 60 mL 11     levonorgestrel (MIRENA) 20 MCG/24HR IUD 1 each by Intrauterine route       tretinoin (RETIN-A) 0.025 % external cream Apply pea-sized amount to face at bedtime. Start every other night for 1-2 weeks, then increase to nightly as tolerated. 45 g 11        Family History:  Family History   Problem Relation Age of Onset     Breast Cancer Mother 40     Osteoporosis Mother         She has osteopenia     Colon Polyps Mother         2-5     Alcoholism Father      Anxiety Disorder Father      Depression Father      Suicide Father 83     Breast Cancer Maternal Grandmother 76     Pancreatic Cancer Maternal Grandmother 79         at 80     Cancer Maternal Grandfather         testicular     Prostate Cancer Maternal Grandfather 90         at 103     Genetic Disorder Paternal Uncle         BRCA2+; pt is negative     Asthma Niece      Breast Cancer Paternal Cousin 30        BRCA2+, pt is negative       Social History:  Social History     Socioeconomic History      Marital status: Single     Spouse name: Federico     Number of children: 0     Years of education: Not on file     Highest education level: Not on file   Occupational History     Occupation: Marketing business, online     Employer: [employer]   Tobacco Use     Smoking status: Never     Smokeless tobacco: Never   Vaping Use     Vaping status: Never Used   Substance and Sexual Activity     Alcohol use: Yes     Alcohol/week: 3.0 standard drinks of alcohol     Types: 3 Standard drinks or equivalent per week     Drug use: No     Sexual activity: Yes     Partners: Male     Birth control/protection: I.U.D.   Other Topics Concern     Parent/sibling w/ CABG, MI or angioplasty before 65F 55M? 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      Social Determinants of Health     Financial Resource Strain: Low Risk  (7/18/2024)    Financial Resource Strain      Within the past 12 months, have you or your family members you live with been unable to get utilities (heat, electricity) when it was really needed?: No   Food Insecurity: Low Risk  (7/18/2024)    Food Insecurity      Within the past 12 months, did you worry that your food would run out before you got money to buy more?: No      Within the past 12 months, did the food you bought just not last and you didn t have money to get more?: No   Transportation Needs: Low Risk  (7/18/2024)    Transportation Needs      Within the past 12 months, has lack of transportation kept you from medical appointments, getting your medicines, non-medical meetings or appointments, work, or from getting things that you need?: No   Physical Activity: Not on File (12/2/2021)    Received from [facility]    Physical Activity      Physical Activity: 0   Stress: No Stress Concern Present (7/19/2024)    [employer] - Occupational Stress Questionnaire       Feeling of Stress : Only a little   Social Connections: Not on File (12/2/2021)    Received from [facility]    Social Connections      Social Connections and Isolation: 0   Interpersonal Safety: Not on file   Housing Stability: Low Risk  (7/18/2024)    Housing Stability      Do you have housing? : Yes      Are you worried about losing your housing?: No       Physical Exam:  /85 (BP Location: Right arm, Patient Position: Sitting, Cuff Size: Adult Regular)   Pulse 84   Temp 97.7  F (36.5  C) (Oral)   Resp 16   Wt 81.8 kg (180 lb 4.8 oz)   SpO2 97%   BMI 30.00 kg/m    GENERAL APPEARANCE: healthy, alert and no distress  BREAST: A multipositional, bilateral breast exam was performed.  Fairly symmetrical. Nipples everted bilaterally. Bilateral, well healed scars from prior breast reduction surgery. Right breast: no palpable dominant masses, no nipple discharge, no skin changes. Dense tissue.  Right axilla: no palpable adenopathy. Left breast: no palpable dominant masses, no nipple discharge, no skin changes. Left axilla: no palpable adenopathy. Dense tissue.    LYMPHATICS: No cervical, supraclavicular, or axillary lymphadenopathy    Laboratory/Imaging Studies  No results found for any visits on 08/20/24.    ASSESSMENT    Vero states that she is doing well at this visit. She has no updates to her family's medical history, and no concerns with her breast tissue. We discussed her last screening tests and reviewed results.  We discussed concerns and symptoms to be watchful for between visits, including breast lumps, bumps, nipple inversion, nipple discharge and any changes in skin including crinkled or puckered skin. We discussed the recommendation for breast self awareness. We will proceed with the plan below.       Individualized Surveillance Plan for women  With 20% or greater lifetime risk of breast cancer   Per NCCN Breast Cancer Screening and Diagnosis Guidelines Version 2.2024   Recommended screening  Test or procedure Last done Next Scheduled    Clinical encounter Clinical exam every 6-12 months.   Refer to genetic counseling if not already done.  Consider risk reduction strategies.   August 2024 August 2025   However, some family histories with breast cancers at a very young age, may warrant screening starting earlier.    *May begin at age 40 if breast cancers in the family occur at later ages.    Annual mammogram beginning 10 years younger than the earliest breast cancer in the family but not prior to age 30.    Recommend annual breast MRI to begin 10 years younger than the earliest breast cancer in the family but not prior to age 25.    Breast MRIs are preferably done on day 7-15 of the menstrual cycle in premenopausal women.   8/21/2023: Baseline screening tomosynthesis mammogram, BiRads2    1/10/2024: Breast MUNDO, BiRads1    Mammogram today    Breast MRI in February 2025    Return to clinic in August, 2025 with a mammogram following our visit   Breast screening for patients at high risk due to thoracic radiation between the ages of 10-30   Annual clinical exam beginning 8 years after radiation therapy.    Annual screening mammogram beginning at age 30 or 8 years after radiation therapy    Annual breast MRI, beginning at age 25 or 8 years after radiation therapy.     NA   NA   Women who have a lifetime risk of >20% based on history of LCIS or ADH/ALH Annual screening mammogram beginning at age of LCIS or ADH/ALH but not prior to age 30.    Consider annual MRI to begin at age of diagnosis of LCIS or ADH/ALH but not prior to age 25.    Consider risk reducing strategies.   NA   NA    Recommend risk reducing strategies for women with 1.7% 5 year risk of breast cancer.         I spent a total of 19 minutes on the day of the visit. Please see the note for further information on patient assessment and treatment.     Obdulia Christensen, DNP, APRN, AGCNS-BC  Clinical Nurse Specialist  Cancer Risk Management  Program  [facility]    Cc:  Mabel Dumont CNM              Again, thank you for allowing me to participate in the care of your patient.        Sincerely,        RACHEAL Leroy CNP

## 2024-08-20 NOTE — PATIENT INSTRUCTIONS
Individualized Surveillance Plan for women  With 20% or greater lifetime risk of breast cancer   Per NCCN Breast Cancer Screening and Diagnosis Guidelines Version 2.2024   Recommended screening Test or procedure Last done Next Scheduled    Clinical encounter Clinical exam every 6-12 months.   Refer to genetic counseling if not already done.  Consider risk reduction strategies.   August 2024 August 2025   However, some family histories with breast cancers at a very young age, may warrant screening starting earlier.    *May begin at age 40 if breast cancers in the family occur at later ages.    Annual mammogram beginning 10 years younger than the earliest breast cancer in the family but not prior to age 30.    Recommend annual breast MRI to begin 10 years younger than the earliest breast cancer in the family but not prior to age 25.    Breast MRIs are preferably done on day 7-15 of the menstrual cycle in premenopausal women.   8/21/2023: Baseline screening tomosynthesis mammogram, BiRads2    1/10/2024: Breast MUNDO, BiRads1    Mammogram today    Breast MRI in February 2025    Return to clinic in August, 2025 with a mammogram following our visit   Breast screening for patients at high risk due to thoracic radiation between the ages of 10-30   Annual clinical exam beginning 8 years after radiation therapy.    Annual screening mammogram beginning at age 30 or 8 years after radiation therapy    Annual breast MRI, beginning at age 25 or 8 years after radiation therapy.     NA   NA   Women who have a lifetime risk of >20% based on history of LCIS or ADH/ALH Annual screening mammogram beginning at age of LCIS or ADH/ALH but not prior to age 30.    Consider annual MRI to begin at age of diagnosis of LCIS or ADH/ALH but not prior to age 25.    Consider risk reducing strategies.   NA   NA    Recommend risk reducing strategies for women with 1.7% 5 year risk of breast cancer.

## 2024-08-20 NOTE — NURSING NOTE
"Oncology Rooming Note    August 20, 2024 8:32 AM   Vero Honeycutt is a 29 year old female who presents for:    Chief Complaint   Patient presents with    Oncology Clinic Visit     Breast cancer screening, high risk patient          Initial Vitals: /85 (BP Location: Right arm, Patient Position: Sitting, Cuff Size: Adult Regular)   Pulse 84   Temp 97.7  F (36.5  C) (Oral)   Resp 16   Wt 81.8 kg (180 lb 4.8 oz)   SpO2 97%   BMI 30.00 kg/m   Estimated body mass index is 30 kg/m  as calculated from the following:    Height as of 7/19/23: 1.651 m (5' 5\").    Weight as of this encounter: 81.8 kg (180 lb 4.8 oz). Body surface area is 1.94 meters squared.  No Pain (0) Comment: Data Unavailable   No LMP recorded. (Menstrual status: IUD).  Allergies reviewed: Yes  Medications reviewed: Yes    Medications: Medication refills not needed today.  Pharmacy name entered into EPIC:    [facility] - [place], [place] - [address]  [id number] - [place], [place] - [address]    Frailty Screening:   Is the patient here for a new oncology consult visit in cancer care? 2. No      Clinical concerns: none       Heather Barahona            "

## 2024-12-11 ENCOUNTER — OFFICE VISIT (OUTPATIENT)
Dept: OBGYN | Facility: CLINIC | Age: 29
End: 2024-12-11
Attending: ADVANCED PRACTICE MIDWIFE
Payer: COMMERCIAL

## 2024-12-11 VITALS
SYSTOLIC BLOOD PRESSURE: 130 MMHG | DIASTOLIC BLOOD PRESSURE: 84 MMHG | BODY MASS INDEX: 30.95 KG/M2 | HEART RATE: 76 BPM | WEIGHT: 186 LBS

## 2024-12-11 DIAGNOSIS — Z30.432 ENCOUNTER FOR REMOVAL OF INTRAUTERINE CONTRACEPTIVE DEVICE: Primary | ICD-10-CM

## 2024-12-11 DIAGNOSIS — Z97.5 IUD (INTRAUTERINE DEVICE) IN PLACE: ICD-10-CM

## 2024-12-11 DIAGNOSIS — Z30.430 ENCOUNTER FOR INSERTION OF INTRAUTERINE CONTRACEPTIVE DEVICE: ICD-10-CM

## 2024-12-11 PROCEDURE — 58301 REMOVE INTRAUTERINE DEVICE: CPT | Performed by: ADVANCED PRACTICE MIDWIFE

## 2024-12-11 PROCEDURE — 64435 NJX AA&/STRD PARACRV NRV: CPT | Performed by: ADVANCED PRACTICE MIDWIFE

## 2024-12-11 PROCEDURE — 58300 INSERT INTRAUTERINE DEVICE: CPT | Performed by: ADVANCED PRACTICE MIDWIFE

## 2024-12-11 PROCEDURE — 250N000011 HC RX IP 250 OP 636: Performed by: ADVANCED PRACTICE MIDWIFE

## 2024-12-11 RX ADMIN — LEVONORGESTREL 1 EACH: 52 INTRAUTERINE DEVICE INTRAUTERINE at 10:48

## 2024-12-11 ASSESSMENT — PAIN SCALES - GENERAL: PAINLEVEL_OUTOF10: NO PAIN (0)

## 2024-12-11 NOTE — NURSING NOTE
Clinic Administered Medication Documentation      Intrauterine/Implant Insertion Documentation    Device was placed by provider (please see MAR for given by information). Please see MAR and medication order for additional information.     Type: Mirena  Remove/Replace by: Chelly Collado    Expiration Date:  2-27

## 2024-12-11 NOTE — PROGRESS NOTES
SUBJECTIVE:    Is a pregnancy test required: No.  Was a consent obtained?  Yes    Subjective: Vero Honeycutt is a 29 year old  presents for IUD removal and replacement of Mirena IUD under paracervical block.  She requests removal of the IUD because the IUD effectiveness has     Patient has been given the opportunity to ask questions about all forms of birth control, including all options appropriate for Vero Honeycutt. Discussed that no method of birth control, except abstinence is 100% effective against pregnancy or sexually transmitted infection.     Vero Honeycutt understands she may have the IUD removed at any time. IUD should be removed by a health care provider and the current IUD will be removed today.    The entire removal and insertion procedure was reviewed with the patient, including care after placement.    Today's PHQ-2 Score:       2024    10:01 AM   PHQ-2 (  [employer])   Q1: Little interest or pleasure in doing things 0   Q2: Feeling down, depressed or hopeless 0   PHQ-2 Score 0       PROCEDURE:    Premedicated with ibuprofen. Paracervical block performed.  A speculum exam was performed and the cervix was visualized. The IUD string was not visualized. Teased out of cervix with IUD hook, then visualized at os Using ring forceps, the string was grasped and the IUD removed intact.    Under sterile technique, cervix was visualized with speculum and prepped with Betadine solution swab x 3. Paracervical block performed Tenaculum was placed for stability. The uterus was gently straightened and sounded to 7.0 cm. IUD prepared for placement, and IUD inserted according to 's instructions without difficulty or significant ressitance, and deployed at the fundus. The strings were visualized and trimmed to 3.0 cm from the external os. Tenaculum was removed and hemostasis noted after application of silver nitrate to puncture sites where paracervical block was performed Speculum  removed.  Patient tolerated procedure well.    EBL: minimal    Complications: none    Lot# [id number]  Expiration Feb 2027    POST PROCEDURE:    Given 's handouts, including when to have IUD removed, list of danger s/sx, side effects and follow up recommended. Encouraged condom use for prevention of STD. Advised to call for any fever, for prolonged or severe pain or bleeding, abnormal vaginal dischage, or unable to palpate strings. She was advised to use pain medications (ibuprofen) as needed for mild to moderate pain. Advised to follow-up in clinic in 4-6 weeks for IUD string check if unable to find strings or as directed by provider.     RACHEAL Villegas CNM

## 2024-12-11 NOTE — LETTER
2024       RE: Vero Honeycutt   [address]  [place] [address]     Dear Colleague,    Thank you for referring your patient, Vero Honeycutt, to the [facility] [place] at [facility]. Please see a copy of my visit note below.    SUBJECTIVE:    Is a pregnancy test required: No.  Was a consent obtained?  Yes    Subjective: Vero Honeycutt is a 29 year old  presents for IUD removal and replacement of Mirena IUD under paracervical block.  She requests removal of the IUD because the IUD effectiveness has     Patient has been given the opportunity to ask questions about all forms of birth control, including all options appropriate for Vero Honeycutt. Discussed that no method of birth control, except abstinence is 100% effective against pregnancy or sexually transmitted infection.     Vero Honeycutt understands she may have the IUD removed at any time. IUD should be removed by a health care provider and the current IUD will be removed today.    The entire removal and insertion procedure was reviewed with the patient, including care after placement.    Today's PHQ-2 Score:       2024    10:01 AM   PHQ-2 (  [employer])   Q1: Little interest or pleasure in doing things 0   Q2: Feeling down, depressed or hopeless 0   PHQ-2 Score 0       PROCEDURE:    Premedicated with ibuprofen. Paracervical block performed.  A speculum exam was performed and the cervix was visualized. The IUD string was not visualized. Teased out of cervix with IUD hook, then visualized at os Using ring forceps, the string was grasped and the IUD removed intact.    Under sterile technique, cervix was visualized with speculum and prepped with Betadine solution swab x 3. Paracervical block performed Tenaculum was placed for stability. The uterus was gently straightened and sounded to 7.0 cm. IUD prepared for placement, and IUD inserted according to 's  instructions without difficulty or significant ressitance, and deployed at the fundus. The strings were visualized and trimmed to 3.0 cm from the external os. Tenaculum was removed and hemostasis noted after application of silver nitrate to puncture sites where paracervical block was performed Speculum removed.  Patient tolerated procedure well.    EBL: minimal    Complications: none    Lot# [id number]  Expiration Feb 2027    POST PROCEDURE:    Given 's handouts, including when to have IUD removed, list of danger s/sx, side effects and follow up recommended. Encouraged condom use for prevention of STD. Advised to call for any fever, for prolonged or severe pain or bleeding, abnormal vaginal dischage, or unable to palpate strings. She was advised to use pain medications (ibuprofen) as needed for mild to moderate pain. Advised to follow-up in clinic in 4-6 weeks for IUD string check if unable to find strings or as directed by provider.     RACHEAL Villegas CNM      Again, thank you for allowing me to participate in the care of your patient.      Sincerely,    RACHEAL Villegas CNM

## 2025-02-07 ENCOUNTER — ANCILLARY PROCEDURE (OUTPATIENT)
Dept: MRI IMAGING | Facility: CLINIC | Age: 30
End: 2025-02-07
Payer: COMMERCIAL

## 2025-02-07 DIAGNOSIS — Z80.3 FAMILY HISTORY OF MALIGNANT NEOPLASM OF BREAST: ICD-10-CM

## 2025-02-07 DIAGNOSIS — Z12.39 BREAST CANCER SCREENING, HIGH RISK PATIENT: ICD-10-CM

## 2025-02-07 DIAGNOSIS — R92.30 DENSE BREAST: ICD-10-CM

## 2025-02-07 PROCEDURE — 77049 MRI BREAST C-+ W/CAD BI: CPT | Performed by: RADIOLOGY

## 2025-02-07 PROCEDURE — A9585 GADOBUTROL INJECTION: HCPCS | Mod: JW | Performed by: RADIOLOGY

## 2025-02-07 RX ORDER — GADOBUTROL 604.72 MG/ML
10 INJECTION INTRAVENOUS ONCE
Status: COMPLETED | OUTPATIENT
Start: 2025-02-07 | End: 2025-02-07

## 2025-02-07 RX ADMIN — GADOBUTROL 8.5 ML: 604.72 INJECTION INTRAVENOUS at 14:10

## 2025-02-07 NOTE — DISCHARGE INSTRUCTIONS
MRI Contrast Discharge Instructions    The IV contrast you received today will pass out of your body in your  urine. This will happen in the next 24 hours. You will not feel this process.  Your urine will not change color.    Drink at least 4 extra glasses of water or juice today (unless your doctor  has restricted your fluids). This reduces the stress on your kidneys.  You may take your regular medicines.    If you are on dialysis: It is best to have dialysis today.    If you have a reaction: Most reactions happen right away. If you have  any new symptoms after leaving the hospital (such as hives or swelling),  call your hospital at the correct number below. Or call your family doctor.  If you have breathing distress or wheezing, call 911.    Special instructions: ***    I have read and understand the above information.    Signature:______________________________________ Date:___________    Staff:__________________________________________ Date:___________     Time:__________    [facility] Departments:    ___[place]: [phone]  ___[facility]: [phone]  ___[place]: [phone] ___[facility]: [phone]  ___[facility]: [phone]  ___[facility]: [phone]  ___[phone]  ___[facility]: [phone]  ___[phone]

## 2025-03-14 ASSESSMENT — ANXIETY QUESTIONNAIRES
5. BEING SO RESTLESS THAT IT IS HARD TO SIT STILL: NOT AT ALL
GAD7 TOTAL SCORE: 3
6. BECOMING EASILY ANNOYED OR IRRITABLE: SEVERAL DAYS
GAD7 TOTAL SCORE: 3
4. TROUBLE RELAXING: NOT AT ALL
1. FEELING NERVOUS, ANXIOUS, OR ON EDGE: SEVERAL DAYS
7. FEELING AFRAID AS IF SOMETHING AWFUL MIGHT HAPPEN: SEVERAL DAYS
GAD7 TOTAL SCORE: 3
7. FEELING AFRAID AS IF SOMETHING AWFUL MIGHT HAPPEN: SEVERAL DAYS
2. NOT BEING ABLE TO STOP OR CONTROL WORRYING: NOT AT ALL
IF YOU CHECKED OFF ANY PROBLEMS ON THIS QUESTIONNAIRE, HOW DIFFICULT HAVE THESE PROBLEMS MADE IT FOR YOU TO DO YOUR WORK, TAKE CARE OF THINGS AT HOME, OR GET ALONG WITH OTHER PEOPLE: NOT DIFFICULT AT ALL
8. IF YOU CHECKED OFF ANY PROBLEMS, HOW DIFFICULT HAVE THESE MADE IT FOR YOU TO DO YOUR WORK, TAKE CARE OF THINGS AT HOME, OR GET ALONG WITH OTHER PEOPLE?: NOT DIFFICULT AT ALL
3. WORRYING TOO MUCH ABOUT DIFFERENT THINGS: NOT AT ALL

## 2025-03-15 ENCOUNTER — HEALTH MAINTENANCE LETTER (OUTPATIENT)
Age: 30
End: 2025-03-15

## 2025-03-19 ENCOUNTER — LAB (OUTPATIENT)
Dept: LAB | Facility: CLINIC | Age: 30
End: 2025-03-19
Attending: ADVANCED PRACTICE MIDWIFE
Payer: COMMERCIAL

## 2025-03-19 ENCOUNTER — OFFICE VISIT (OUTPATIENT)
Dept: OBGYN | Facility: CLINIC | Age: 30
End: 2025-03-19
Attending: ADVANCED PRACTICE MIDWIFE
Payer: COMMERCIAL

## 2025-03-19 VITALS
HEART RATE: 68 BPM | BODY MASS INDEX: 30.62 KG/M2 | HEIGHT: 65 IN | DIASTOLIC BLOOD PRESSURE: 81 MMHG | SYSTOLIC BLOOD PRESSURE: 132 MMHG

## 2025-03-19 DIAGNOSIS — Z80.3 FAMILY HISTORY OF MALIGNANT NEOPLASM OF BREAST: ICD-10-CM

## 2025-03-19 DIAGNOSIS — Z13.1 SCREENING FOR DIABETES MELLITUS: ICD-10-CM

## 2025-03-19 DIAGNOSIS — Z13.29 SCREENING FOR THYROID DISORDER: ICD-10-CM

## 2025-03-19 DIAGNOSIS — Z00.00 ROUTINE GENERAL MEDICAL EXAMINATION AT A HEALTH CARE FACILITY: ICD-10-CM

## 2025-03-19 DIAGNOSIS — Z00.00 ROUTINE GENERAL MEDICAL EXAMINATION AT A HEALTH CARE FACILITY: Primary | ICD-10-CM

## 2025-03-19 DIAGNOSIS — Z97.5 IUD (INTRAUTERINE DEVICE) IN PLACE: ICD-10-CM

## 2025-03-19 DIAGNOSIS — Z13.220 SCREENING FOR LIPOID DISORDERS: ICD-10-CM

## 2025-03-19 DIAGNOSIS — Z00.00 VISIT FOR PREVENTIVE HEALTH EXAMINATION: ICD-10-CM

## 2025-03-19 LAB
ALBUMIN SERPL BCG-MCNC: 4.4 G/DL (ref 3.5–5.2)
ALP SERPL-CCNC: 55 U/L (ref 40–150)
ALT SERPL W P-5'-P-CCNC: 18 U/L (ref 0–50)
ANION GAP SERPL CALCULATED.3IONS-SCNC: 12 MMOL/L (ref 7–15)
AST SERPL W P-5'-P-CCNC: 16 U/L (ref 0–45)
BASOPHILS # BLD AUTO: 0 10E3/UL (ref 0–0.2)
BASOPHILS NFR BLD AUTO: 1 %
BILIRUB SERPL-MCNC: 0.3 MG/DL
BUN SERPL-MCNC: 14.6 MG/DL (ref 6–20)
CALCIUM SERPL-MCNC: 9.4 MG/DL (ref 8.8–10.4)
CHLORIDE SERPL-SCNC: 103 MMOL/L (ref 98–107)
CHOLEST SERPL-MCNC: 156 MG/DL
CREAT SERPL-MCNC: 0.65 MG/DL (ref 0.51–0.95)
EGFRCR SERPLBLD CKD-EPI 2021: >90 ML/MIN/1.73M2
EOSINOPHIL # BLD AUTO: 0.1 10E3/UL (ref 0–0.7)
EOSINOPHIL NFR BLD AUTO: 2 %
ERYTHROCYTE [DISTWIDTH] IN BLOOD BY AUTOMATED COUNT: 12.4 % (ref 10–15)
EST. AVERAGE GLUCOSE BLD GHB EST-MCNC: 103 MG/DL
FASTING STATUS PATIENT QL REPORTED: YES
FASTING STATUS PATIENT QL REPORTED: YES
GLUCOSE SERPL-MCNC: 95 MG/DL (ref 70–99)
HBA1C MFR BLD: 5.2 %
HCO3 SERPL-SCNC: 24 MMOL/L (ref 22–29)
HCT VFR BLD AUTO: 38.3 % (ref 35–47)
HDLC SERPL-MCNC: 67 MG/DL
HGB BLD-MCNC: 13.1 G/DL (ref 11.7–15.7)
IMM GRANULOCYTES # BLD: 0 10E3/UL
IMM GRANULOCYTES NFR BLD: 0 %
LDLC SERPL CALC-MCNC: 82 MG/DL
LYMPHOCYTES # BLD AUTO: 1.8 10E3/UL (ref 0.8–5.3)
LYMPHOCYTES NFR BLD AUTO: 35 %
MCH RBC QN AUTO: 30.2 PG (ref 26.5–33)
MCHC RBC AUTO-ENTMCNC: 34.2 G/DL (ref 31.5–36.5)
MCV RBC AUTO: 88 FL (ref 78–100)
MONOCYTES # BLD AUTO: 0.5 10E3/UL (ref 0–1.3)
MONOCYTES NFR BLD AUTO: 10 %
NEUTROPHILS # BLD AUTO: 2.6 10E3/UL (ref 1.6–8.3)
NEUTROPHILS NFR BLD AUTO: 52 %
NONHDLC SERPL-MCNC: 89 MG/DL
NRBC # BLD AUTO: 0 10E3/UL
NRBC BLD AUTO-RTO: 0 /100
PLATELET # BLD AUTO: 245 10E3/UL (ref 150–450)
POTASSIUM SERPL-SCNC: 4.5 MMOL/L (ref 3.4–5.3)
PROT SERPL-MCNC: 7.9 G/DL (ref 6.4–8.3)
RBC # BLD AUTO: 4.34 10E6/UL (ref 3.8–5.2)
RUBV IGG SERPL QL IA: 1.89 INDEX
RUBV IGG SERPL QL IA: POSITIVE
SODIUM SERPL-SCNC: 139 MMOL/L (ref 135–145)
TRIGL SERPL-MCNC: 37 MG/DL
TSH SERPL DL<=0.005 MIU/L-ACNC: 1.31 UIU/ML (ref 0.3–4.2)
WBC # BLD AUTO: 5 10E3/UL (ref 4–11)

## 2025-03-19 PROCEDURE — 85004 AUTOMATED DIFF WBC COUNT: CPT

## 2025-03-19 PROCEDURE — 83036 HEMOGLOBIN GLYCOSYLATED A1C: CPT

## 2025-03-19 PROCEDURE — 80053 COMPREHEN METABOLIC PANEL: CPT

## 2025-03-19 PROCEDURE — 86762 RUBELLA ANTIBODY: CPT

## 2025-03-19 PROCEDURE — 80061 LIPID PANEL: CPT

## 2025-03-19 PROCEDURE — 99213 OFFICE O/P EST LOW 20 MIN: CPT | Performed by: ADVANCED PRACTICE MIDWIFE

## 2025-03-19 PROCEDURE — 84443 ASSAY THYROID STIM HORMONE: CPT

## 2025-03-19 PROCEDURE — 85041 AUTOMATED RBC COUNT: CPT

## 2025-03-19 PROCEDURE — 36415 COLL VENOUS BLD VENIPUNCTURE: CPT

## 2025-03-19 NOTE — PROGRESS NOTES
"    Progress Note    SUBJECTIVE:  Vero Honeycutt is an 29 year old, , who requests an Annual Preventive Exam.       Concerns today include: happy w mirena IUD  -freq nose bleeds through the winter, does use saline spray, wondering if there is a solution  -concerns for snoring as well, would like to see ENT  -wondering if she is immune to measles and rubella, had one MMR in 2015 (no vaccine as a child)  -slight rash on upper chest  -fasting today for labs  -pap due today  -no hx of Hep b vaccine, will start series today    Menstrual History:      2022     9:03 AM 2023     8:55 AM 2023     9:00 AM   Menstrual History   LAST MENSTRUAL PERIOD   1/15/2023   Menarche Age 12 years 13.5 years    Period Cycle (Days) random with iud random periods on iud    Period Duration (Days) 2 days 1-2    Method of Contraception Mirena IUD Mirena IUD    Period Pattern Irregular Irregular    Menstrual Flow Light Light    Dysmenorrhea Mild Mild    PMS Symptoms Cramping;Diarrhea Cramping;Mood Changes    Reviewed Today Yes Yes    Comments breast tenderness         Last  No results found for: \"PAP\"  History of abnormal Pap smear: No - age 30-64 HPV with reflex Pap every 5 years recommended    Last No results found for: \"HPV16\"  Last No results found for: \"HPV18\"  Last No results found for: \"HRHPV\"    Mammogram current: yes, does mammo and MRI q 6 mos given her mother's hx  Last Mammogram:   No results found.     Last Colonoscopy:  No results found for this or any previous visit.      HISTORY:  Prescription Medications as of 3/19/2025         Rx Number Disp Refills Start End Last Dispensed Date Next Fill Date Owning Pharmacy    ALPRAZolam (XANAX) 1 MG tablet  1 tablet 0 2024 --   [facility]/pharmacy #[id number] - [place], [place] - [address]    Sig: Take 1 tablet (1 mg) by mouth once as needed for anxiety (prior to breast MRI)    Class: E-Prescribe    Notes to Pharmacy: Please do not drive after taking this medication.    " Route: Oral    clindamycin (CLEOCIN T) 1 % external lotion  60 mL 11 2024 --   [facility]/pharmacy #[id number] - [place], [place] - [address]    Sig: Apply topically 2 times daily To face as needed    Class: E-Prescribe    Route: Topical    tretinoin (RETIN-A) 0.025 % external cream  45 g 11 2024 --   [facility]/pharmacy #[id number] - [place], [place] - [address]    Sig: Apply pea-sized amount to face at bedtime. Start every other night for 1-2 weeks, then increase to nightly as tolerated.    Class: E-Prescribe    benzonatate (TESSALON) 100 MG capsule  20 capsule 0 2025 --   [facility]/pharmacy #[id number] - [place], [place] - [address]    Sig: Take 1 capsule (100 mg) by mouth 3 times daily as needed for cough.    Class: E-Prescribe    Route: Oral    fluticasone (FLONASE) 50 MCG/ACT nasal spray  9.9 mL 0 2025 --   [facility]/pharmacy #[id number] - [place], [place] - [address]    Sig: [place] 2 sprays into both nostrils daily.    Class: E-Prescribe    Route: Both Nostrils          No Known Allergies  Immunization History   Administered Date(s) Administered    COVID-19 12+ (MODERNA) 10/26/2023, 09/10/2024    COVID-19 Bivalent 12+ (Pfizer) 2022    COVID-19 MONOVALENT 12+ (Pfizer) 2021    HPV9 (Gardasil) 2018, 2018    Hepatitis B, Adult (Energix-B/Recombivax HB) 2025    INFLUENZA,TRIVALENT (FLUCELVAX) 09/10/2024    Influenza Vaccine >6 months,quad, PF 2022    Influenza,INJ,MDCK,PF,Quad >6mo(Flucelvax) 10/26/2023    MMR (MMRII) 2015    Meningococcal (Menomune ) 10/14/2012    TD,PF 7+ (Tenivac) 08/15/2011    TDAP (Adacel,Boostrix) 2016    Td (Adult), Adsorbed 2005, 2011    Td,adult,historic,unspecified 2005, 08/15/2011, 2011       OB History    Para Term  AB Living   0 0 0 0 0 0   SAB IAB Ectopic Multiple Live Births   0 0 0 0 0     Past Medical History:   Diagnosis Date    Urinary tract infection I had about three in 2018, but the issue  resolved     Past Surgical History:   Procedure Laterality Date    HC REDUCTION OF LARGE BREAST      Description: Breast Surgery Reduction Procedure;  Recorded: 2014;    MAMMOPLASTY REDUCTION BILATERAL      ORTHOPEDIC SURGERY  ACL reconstruction 2016    DE HYMENOTOMY, SIMPLE INCISION      Description: Hymenotomy;  Recorded: 2013;    WISDOM TOOTH EXTRACTION       Family History   Problem Relation Age of Onset    Breast Cancer Mother 40    Osteoporosis Mother         She has osteopenia    Colon Polyps Mother         2-5    Alcoholism Father     Anxiety Disorder Father     Depression Father     Suicide Father 83    Breast Cancer Maternal Grandmother 76    Pancreatic Cancer Maternal Grandmother 79         at 80    Other Cancer Maternal Grandmother         Pancreatic    Cancer Maternal Grandfather         testicular    Prostate Cancer Maternal Grandfather 90         at 103    Genetic Disorder Paternal Uncle         BRCA2+; pt is negative    Asthma Niece     Breast Cancer Paternal Cousin 30        BRCA2+, pt is negative    Breast Cancer Cousin      Social History     Socioeconomic History    Marital status: Single     Spouse name: Federico    Number of children: 0   Occupational History    Occupation: Marketing business, online     Employer: [employer]   Tobacco Use    Smoking status: Never    Smokeless tobacco: Never   Vaping Use    Vaping status: Never Used   Substance and Sexual Activity    Alcohol use: Yes     Alcohol/week: 3.0 standard drinks of alcohol     Types: 3 Standard drinks or equivalent per week    Drug use: No    Sexual activity: Yes     Partners: Male     Birth control/protection: I.U.D.   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      Social Drivers of Health     Financial Resource Strain: Low Risk  (2024)    Financial Resource Strain     Within the past 12 months,  "have you or your family members you live with been unable to get utilities (heat, electricity) when it was really needed?: No   Food Insecurity: Not on File (9/26/2024)    Received from [facility]    Food Insecurity     Food: 0   Transportation Needs: Low Risk  (7/18/2024)    Transportation Needs     Within the past 12 months, has lack of transportation kept you from medical appointments, getting your medicines, non-medical meetings or appointments, work, or from getting things that you need?: No   Physical Activity: Not on File (12/2/2021)    Received from [facility]    Physical Activity     Physical Activity: 0   Stress: No Stress Concern Present (7/19/2024)    [place] [employer] - Occupational Stress Questionnaire     Feeling of Stress : Only a little   Social Connections: Not on File (9/12/2024)    Received from [employer]    Social Connections     Connectedness: 0   Housing Stability: Low Risk  (7/18/2024)    Housing Stability     Do you have housing? : Yes     Are you worried about losing your housing?: No       ROS       No data to display                  EXAM:  Blood pressure 132/81, pulse 68, height 1.651 m (5' 5\"). Body mass index is 30.62 kg/m .  General - pleasant female in no acute distress.  Skin - no suspicious lesions or rashes  EENT-  PERRLA, euthyroid with out palpable nodules  Neck - supple without lymphadenopathy.  Lungs - clear to auscultation bilaterally.  Heart - regular rate and rhythm without murmur.  Abdomen - soft, nontender, nondistended, no masses or organomegaly noted.  Musculoskeletal - no gross deformities.  Neurological - normal strength, sensation, and mental status.    Breast Exam:  Breast: Without visible skin changes. No dimpling or lesions seen.   Breasts supple, non-tender with palpation, no dominant mass, nodularity, or nipple discharge noted bilaterally. Axillary nodes negative.  Scars from bilateral reduction surgery    Pelvic Exam:  EG/BUS: Normal genital " architecture without lesions, erythema or abnormal secretions Bartholin's, Urethra, [place]'s normal   Urethral meatus: normal   Urethra: no masses, tenderness, or scarring   Bladder: no masses or tenderness   Vagina: moist, pink, rugae with creamy, white, and odorless  secretions  Cervix: Nulliparous,, no lesions, and IUD strings extend 3 cm from external os.      ASSESSMENT:    Encounter Diagnoses   Name Primary?    Family history of malignant neoplasm of breast     IUD (intrauterine device) in place     Routine general medical examination at a health care facility Yes    Screening for diabetes mellitus     Screening for lipoid disorders     Screening for thyroid disorder     Visit for preventive health examination           PLAN:   Orders Placed This Encounter   Procedures    Obtaining, preparing and conveyance of cervical or vaginal smear to laboratory.    HEPATITIS B VACCINE,ADULT,IM    Lipid panel reflex to direct LDL Fasting    TSH with free T4 reflex    Hemoglobin A1c    Comprehensive metabolic panel    HPV and Gynecologic Cytology Panel - Recommended Age 30 - 65 Years    Rubella Antibody IgG    Adult ENT  Referral    CBC with Platelets Differential        Additional teaching done at this visit regarding self breast exam and birth control.    Return to clinic in one year.  Follow-up as needed.  Pt will continue Hep B series at a pharmacy, discussed schedule for next the 2--#2 in one month, #3 in 6 mos from today  If non-immune to rubella, will recommend another MMR vaccine  Will try hydrocortisone on rash, in no improvement will reach out to derm.  Answers submitted by the patient for this visit:  Patient Health Questionnaire (G7) (Submitted on 3/14/2025)  KRISTIN 7 TOTAL SCORE: 3  Mabel Dumont, DNP, APRN, CNM, FACNM

## 2025-03-19 NOTE — LETTER
"3/19/2025       RE: Vero Honeycutt   [address]  [place] [address]     Dear Colleague,    Thank you for referring your patient, Vero Honeycutt, to the [facility] [place] at [facility]. Please see a copy of my visit note below.        Progress Note    SUBJECTIVE:  Vero Honeycutt is an 29 year old, , who requests an Annual Preventive Exam.       Concerns today include: happy w mirena IUD  -freq nose bleeds through the winter, does use saline spray, wondering if there is a solution  -concerns for snoring as well, would like to see ENT  -wondering if she is immune to measles and rubella, had one MMR in 2015 (no vaccine as a child)  -slight rash on upper chest  -fasting today for labs  -pap due today  -no hx of Hep b vaccine, will start series today    Menstrual History:      2022     9:03 AM 2023     8:55 AM 2023     9:00 AM   Menstrual History   LAST MENSTRUAL PERIOD   1/15/2023   Menarche Age 12 years 13.5 years    Period Cycle (Days) random with iud random periods on iud    Period Duration (Days) 2 days 1-2    Method of Contraception Mirena IUD Mirena IUD    Period Pattern Irregular Irregular    Menstrual Flow Light Light    Dysmenorrhea Mild Mild    PMS Symptoms Cramping;Diarrhea Cramping;Mood Changes    Reviewed Today Yes Yes    Comments breast tenderness         Last  No results found for: \"PAP\"  History of abnormal Pap smear: No - age 30-64 HPV with reflex Pap every 5 years recommended    Last No results found for: \"HPV16\"  Last No results found for: \"HPV18\"  Last No results found for: \"HRHPV\"    Mammogram current: yes, does mammo and MRI q 6 mos given her mother's hx  Last Mammogram:   No results found.     Last Colonoscopy:  No results found for this or any previous visit.      HISTORY:  Prescription Medications as of 3/19/2025         Rx Number Disp Refills Start End Last Dispensed Date Next Fill Date Owning " Pharmacy    ALPRAZolam (XANAX) 1 MG tablet  1 tablet 0 8/20/2024 --   [facility]/pharmacy #[id number] - [place], [place] - [address]    Sig: Take 1 tablet (1 mg) by mouth once as needed for anxiety (prior to breast MRI)    Class: E-Prescribe    Notes to Pharmacy: Please do not drive after taking this medication.    Route: Oral    clindamycin (CLEOCIN T) 1 % external lotion  60 mL 11 8/7/2024 --   [facility]/pharmacy #[id number] - [place], [place] - [address]    Sig: Apply topically 2 times daily To face as needed    Class: E-Prescribe    Route: Topical    tretinoin (RETIN-A) 0.025 % external cream  45 g 11 8/7/2024 --   [facility]/pharmacy #[id number] - [place], [place] - [address]    Sig: Apply pea-sized amount to face at bedtime. Start every other night for 1-2 weeks, then increase to nightly as tolerated.    Class: E-Prescribe    benzonatate (TESSALON) 100 MG capsule  20 capsule 0 2/28/2025 --   [facility]/pharmacy #[id number] - [place], [place] - [address]    Sig: Take 1 capsule (100 mg) by mouth 3 times daily as needed for cough.    Class: E-Prescribe    Route: Oral    fluticasone (FLONASE) 50 MCG/ACT nasal spray  9.9 mL 0 2/28/2025 --   [facility]/pharmacy #[id number] - [place], [place] - [address]    Sig: [place] 2 sprays into both nostrils daily.    Class: E-Prescribe    Route: Both Nostrils          No Known Allergies  Immunization History   Administered Date(s) Administered     COVID-19 12+ (MODERNA) 10/26/2023, 09/10/2024     COVID-19 Bivalent 12+ (Pfizer) 11/02/2022     COVID-19 MONOVALENT 12+ (Pfizer) 12/02/2021     HPV9 (Gardasil) 08/07/2018, 09/12/2018     Hepatitis B, Adult (Energix-B/Recombivax HB) 03/19/2025     INFLUENZA,TRIVALENT (FLUCELVAX) 09/10/2024     Influenza Vaccine >6 months,quad, PF 12/02/2022     Influenza,INJ,MDCK,PF,Quad >6mo(Flucelvax) 10/26/2023     MMR (MMRII) 03/16/2015     Meningococcal (Menomune ) 10/14/2012     TD,PF 7+ (Tenivac) 08/15/2011     TDAP (Adacel,Boostrix) 12/23/2016     Td (Adult), Adsorbed  2005, 2011     Td,adult,historic,unspecified 2005, 08/15/2011, 2011       OB History    Para Term  AB Living   0 0 0 0 0 0   SAB IAB Ectopic Multiple Live Births   0 0 0 0 0     Past Medical History:   Diagnosis Date     Urinary tract infection I had about three in 2018, but the issue resolved     Past Surgical History:   Procedure Laterality Date     HC REDUCTION OF LARGE BREAST      Description: Breast Surgery Reduction Procedure;  Recorded: 2014;     MAMMOPLASTY REDUCTION BILATERAL       ORTHOPEDIC SURGERY  ACL reconstruction 2016     AR HYMENOTOMY, SIMPLE INCISION      Description: Hymenotomy;  Recorded: 2013;     WISDOM TOOTH EXTRACTION       Family History   Problem Relation Age of Onset     Breast Cancer Mother 40     Osteoporosis Mother         She has osteopenia     Colon Polyps Mother         2-5     Alcoholism Father      Anxiety Disorder Father      Depression Father      Suicide Father 83     Breast Cancer Maternal Grandmother 76     Pancreatic Cancer Maternal Grandmother 79         at 80     Other Cancer Maternal Grandmother         Pancreatic     Cancer Maternal Grandfather         testicular     Prostate Cancer Maternal Grandfather 90         at 103     Genetic Disorder Paternal Uncle         BRCA2+; pt is negative     Asthma Niece      Breast Cancer Paternal Cousin 30        BRCA2+, pt is negative     Breast Cancer Cousin      Social History     Socioeconomic History     Marital status: Single     Spouse name: Federico     Number of children: 0   Occupational History     Occupation: Marketing business, online     Employer: [employer]   Tobacco Use     Smoking status: Never     Smokeless tobacco: Never   Vaping Use     Vaping status: Never Used   Substance and Sexual Activity     Alcohol use: Yes     Alcohol/week: 3.0 standard drinks of alcohol     Types: 3 Standard drinks or equivalent per week     Drug use: No     Sexual activity:  "Yes     Partners: Male     Birth control/protection: I.U.D.   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      Social Drivers of Health     Financial Resource Strain: Low Risk  (7/18/2024)    Financial Resource Strain      Within the past 12 months, have you or your family members you live with been unable to get utilities (heat, electricity) when it was really needed?: No   Food Insecurity: Not on File (9/26/2024)    Received from [facility]    Food Insecurity      Food: 0   Transportation Needs: Low Risk  (7/18/2024)    Transportation Needs      Within the past 12 months, has lack of transportation kept you from medical appointments, getting your medicines, non-medical meetings or appointments, work, or from getting things that you need?: No   Physical Activity: Not on File (12/2/2021)    Received from [facility]    Physical Activity      Physical Activity: 0   Stress: No Stress Concern Present (7/19/2024)    [employer] [employer] - Occupational Stress Questionnaire      Feeling of Stress : Only a little   Social Connections: Not on File (9/12/2024)    Received from [facility]    Social Connections      Connectedness: 0   Housing Stability: Low Risk  (7/18/2024)    Housing Stability      Do you have housing? : Yes      Are you worried about losing your housing?: No       ROS       No data to display                  EXAM:  Blood pressure 132/81, pulse 68, height 1.651 m (5' 5\"). Body mass index is 30.62 kg/m .  General - pleasant female in no acute distress.  Skin - no suspicious lesions or rashes  EENT-  PERRLA, euthyroid with out palpable nodules  Neck - supple without lymphadenopathy.  Lungs - clear to auscultation bilaterally.  Heart - regular rate and rhythm without murmur.  Abdomen - soft, nontender, nondistended, no masses or organomegaly noted.  Musculoskeletal - no gross " deformities.  Neurological - normal strength, sensation, and mental status.    Breast Exam:  Breast: Without visible skin changes. No dimpling or lesions seen.   Breasts supple, non-tender with palpation, no dominant mass, nodularity, or nipple discharge noted bilaterally. Axillary nodes negative.  Scars from bilateral reduction surgery    Pelvic Exam:  EG/BUS: Normal genital architecture without lesions, erythema or abnormal secretions Bartholin's, Urethra, [place]'s normal   Urethral meatus: normal   Urethra: no masses, tenderness, or scarring   Bladder: no masses or tenderness   Vagina: moist, pink, rugae with creamy, white, and odorless  secretions  Cervix: Nulliparous,, no lesions, and IUD strings extend 3 cm from external os.      ASSESSMENT:    Encounter Diagnoses   Name Primary?     Family history of malignant neoplasm of breast      IUD (intrauterine device) in place      Routine general medical examination at a health care facility Yes     Screening for diabetes mellitus      Screening for lipoid disorders      Screening for thyroid disorder      Visit for preventive health examination           PLAN:   Orders Placed This Encounter   Procedures     Obtaining, preparing and conveyance of cervical or vaginal smear to laboratory.     HEPATITIS B VACCINE,ADULT,IM     Lipid panel reflex to direct LDL Fasting     TSH with free T4 reflex     Hemoglobin A1c     Comprehensive metabolic panel     HPV and Gynecologic Cytology Panel - Recommended Age 30 - 65 Years     Rubella Antibody IgG     Adult ENT  Referral     CBC with Platelets Differential        Additional teaching done at this visit regarding self breast exam and birth control.    Return to clinic in one year.  Follow-up as needed.  Pt will continue Hep B series at a pharmacy, discussed schedule for next the 2--#2 in one month, #3 in 6 mos from today  If non-immune to rubella, will recommend another MMR vaccine  Will try hydrocortisone on rash, in no  improvement will reach out to derm.  Answers submitted by the patient for this visit:  Patient Health Questionnaire (G7) (Submitted on 3/14/2025)  KRISTIN 7 TOTAL SCORE: 3  Mabel Dumont, JUSTINE, APRN, CNM, FACNM        Again, thank you for allowing me to participate in the care of your patient.      Sincerely,    RACHEAL Pratt CNM

## 2025-03-19 NOTE — PATIENT INSTRUCTIONS
Thank you for trusting us with your care!   Please be aware, if you are on Mychart, you may see your results prior to your providers review. If labs are abnormal, we will call or message you on Mychart with a follow up plan.    If you need to contact us for questions about:  Symptoms, Scheduling & Medical Questions; Non-urgent (2-3 day response) Mychart message, Urgent (needing response today) [phone] (if after 3:30pm next day response)   Prescriptions: Please call your Pharmacy   Billing: [facility] [phone] or [facility]:[phone]   Patient Education   Preventive Care Advice   This is general advice given by our system to help you stay healthy. However, your care team may have specific advice just for you. Please talk to your care team about your preventive care needs.  Nutrition  Eat 5 or more servings of fruits and vegetables each day.  Try wheat bread, brown rice and whole grain pasta (instead of white bread, rice, and pasta).  Get enough calcium and vitamin D. Check the label on foods and aim for 100% of the RDA (recommended daily allowance).  Lifestyle  Exercise at least 150 minutes each week  (30 minutes a day, 5 days a week).  Do muscle strengthening activities 2 days a week. These help control your weight and prevent disease.  No smoking.  Wear sunscreen to prevent skin cancer.  Have a dental exam and cleaning every 6 months.  Yearly exams  See your health care team every year to talk about:  Any changes in your health.  Any medicines your care team has prescribed.  Preventive care, family planning, and ways to prevent chronic diseases.  Shots (vaccines)   HPV shots (up to age 26), if you've never had them before.  Hepatitis B shots (up to age 59), if you've never had them before.  COVID-19 shot: Get this shot when it's due.  Flu shot: Get a flu shot every year.  Tetanus shot: Get a tetanus shot every 10 years.  Pneumococcal, hepatitis A, and RSV shots: Ask your care team if you need these  based on your risk.  Shingles shot (for age 50 and up)  General health tests  Diabetes screening:  Starting at age 35, Get screened for diabetes at least every 3 years.  If you are younger than age 35, ask your care team if you should be screened for diabetes.  Cholesterol test: At age 39, start having a cholesterol test every 5 years, or more often if advised.  Bone density scan (DEXA): At age 50, ask your care team if you should have this scan for osteoporosis (brittle bones).  Hepatitis C: Get tested at least once in your life.  STIs (sexually transmitted infections)  Before age 24: Ask your care team if you should be screened for STIs.  After age 24: Get screened for STIs if you're at risk. You are at risk for STIs (including HIV) if:  You are sexually active with more than one person.  You don't use condoms every time.  You or a partner was diagnosed with a sexually transmitted infection.  If you are at risk for HIV, ask about PrEP medicine to prevent HIV.  Get tested for HIV at least once in your life, whether you are at risk for HIV or not.  Cancer screening tests  Cervical cancer screening: If you have a cervix, begin getting regular cervical cancer screening tests starting at age 21.  Breast cancer scan (mammogram): If you've ever had breasts, begin having regular mammograms starting at age 40. This is a scan to check for breast cancer.  Colon cancer screening: It is important to start screening for colon cancer at age 45.  Have a colonoscopy test every 10 years (or more often if you're at risk) Or, ask your provider about stool tests like a FIT test every year or Cologuard test every 3 years.  To learn more about your testing options, visit:   .  For help making a decision, visit:   https://bit.ly/ha95481.  Prostate cancer screening test: If you have a prostate, ask your care team if a prostate cancer screening test (PSA) at age 55 is right for you.  Lung cancer screening: If you are a current or former  smoker ages 50 to 80, ask your care team if ongoing lung cancer screenings are right for you.  For informational purposes only. Not to replace the advice of your health care provider. Copyright   2023 [facility]. All rights reserved. Clinically reviewed by the [facility]. [employer] [id number] - REV 01/24.     PREVENTIVE HEALTH RECOMMENDATIONS:   Most women need a yearly breast and pelvic exam.    A PAP screen, a test done DURING a pelvic exam, is NO longer recommended yearly.    March 2013, screening guidelines recommended by ACOG for PAP screen are:    1) First pap at age 21.    2) Pap every 3 years until age 30.    3) After age 30, pap every 3 years or Pap with HR HPV screen every 5 years until age 65.  4) Women do NOT need a vaginal Pap screen after a hysterectomy (surgical removal of the uterus) when they have not had cancer.    Exceptions:  1) Yearly pap if HIV+ or immunosuppressed secondary to organ transplant  2) CATHY II-III continue routine screening for 20 years.    I encourage you continue looking for opportunities to choose a healthy lifestyle:       * Choose to eat a heart healthy diet. Check out the FOOD PLATE guidelines at: http://www.choosemyplate.gov/ for helpful hints on weight and cholesterol management.  Balance your caloric intake with exercise to maintain a BMI in the 22 to 26 range. For bone health: Eat calcium-rich foods like yogurt, broccoli or take chewable calcium pills (500 to 600 mg) twice a day with food.       * Exercise for at least an average of 30 minutes a day, 5 days of the week. This will help you control your weight, release stress, and help prevent disease.      * Take a Vitamin D3 supplement daily fall through spring and during summer unless you [id number]' full body sun exposure to skin without sunscreen.      * DO wear sunscreen to prevent skin cancer after the first 15-30 minutes.      * Identify stressors in your life, find ways to  release the stress, and, make time for yourself. PLEASE ask for help if mood changes last longer than two weeks.     * Limit alcohol to one drink per day.  No smoking.  Avoid second hand smoke. If you smoke, ask for help to stop.       *  If you are in a sexual relationship, talk with your partner about possible infection risks and take action to protect yourself from exposure to a sexual infection.    Please request an infection screen for STIs (sexually transmitted infections) if you are less than age 26 OR believe that you may be at risk.     Get a flu shot each year. Get a tetanus shot every 10 years. EVERYONE needs a pertussis (Whooping cough) booster.    See your dentist twice a year for an exam and preventive care cleaning.     Consider the following screen tests:    1) cholesterol test every 5 years.     2) yearly mammogram after age 40 unless you have identified risks.    3) colonoscopy every 10 years after age 50 unless you have identified risks.    4) diabetes blood test screening if you are at risk for diabetes.      Additional information that you may also find helpful:  The Internet now gives us access to LOTS of information -- some of it helpful, research documented and also plenty of harmful, anecdotal information that may not pertain to your situtaion. Consider visiting the following websites for accurate health information:    www.vitamindcouncil.org/ : Info and ongoing research re Vitamin D    www.fairview.org : Up to date and easily searchable information on multiple topics.    www.medlineplus.gov : medication info, interactive tutorials, watch real surgeries online    www.cdc.gov : public health info, travel advisories, epidemics (H1N1)    www.reid/std.org: current research re diagnosis, treatment and prevention of sexually contacted infections.    www.health.state.[place] : [place] dept of heatlh, public health issues in [place], N1N1    www.familydoctor.org : good info from the Academy of Family  [employer]icians

## 2025-03-24 LAB
BKR AP ASSOCIATED HPV REPORT: NORMAL
BKR LAB AP GYN ADEQUACY: NORMAL
BKR LAB AP GYN INTERPRETATION: NORMAL
BKR LAB AP PREVIOUS ABNORMAL: NORMAL
PATH REPORT.COMMENTS IMP SPEC: NORMAL
PATH REPORT.COMMENTS IMP SPEC: NORMAL
PATH REPORT.RELEVANT HX SPEC: NORMAL

## 2025-06-26 PROBLEM — R04.0 NASAL BLEEDING: Status: ACTIVE | Noted: 2025-06-26

## 2025-06-26 PROBLEM — J34.2 DEVIATED NASAL SEPTUM: Status: ACTIVE | Noted: 2025-06-26

## 2025-08-27 ENCOUNTER — LAB (OUTPATIENT)
Dept: LAB | Facility: CLINIC | Age: 30
End: 2025-08-27
Payer: COMMERCIAL

## 2025-08-27 ENCOUNTER — OFFICE VISIT (OUTPATIENT)
Dept: DERMATOLOGY | Facility: CLINIC | Age: 30
End: 2025-08-27
Payer: COMMERCIAL

## 2025-08-27 DIAGNOSIS — R04.0 RECURRENT EPISTAXIS: ICD-10-CM

## 2025-08-27 DIAGNOSIS — L70.0 ACNE VULGARIS: ICD-10-CM

## 2025-08-27 DIAGNOSIS — R23.3 EASY BRUISING: ICD-10-CM

## 2025-08-27 DIAGNOSIS — D18.01 CHERRY ANGIOMA: ICD-10-CM

## 2025-08-27 DIAGNOSIS — L81.4 SOLAR LENTIGO: ICD-10-CM

## 2025-08-27 DIAGNOSIS — D22.9 MULTIPLE BENIGN NEVI: ICD-10-CM

## 2025-08-27 DIAGNOSIS — Z12.83 SKIN CANCER SCREENING: Primary | ICD-10-CM

## 2025-08-27 LAB
APTT PPP: 30 SECONDS (ref 22–38)
INR PPP: 0.94 (ref 0.85–1.15)
PROTHROMBIN TIME: 12.7 SECONDS (ref 11.8–14.8)

## 2025-08-27 PROCEDURE — 36415 COLL VENOUS BLD VENIPUNCTURE: CPT | Performed by: PATHOLOGY

## 2025-08-27 PROCEDURE — 85730 THROMBOPLASTIN TIME PARTIAL: CPT | Performed by: PATHOLOGY

## 2025-08-27 PROCEDURE — 85610 PROTHROMBIN TIME: CPT | Performed by: PATHOLOGY

## 2025-08-27 RX ORDER — TRETINOIN 0.25 MG/G
CREAM TOPICAL
Qty: 45 G | Refills: 11 | Status: SHIPPED | OUTPATIENT
Start: 2025-08-27

## 2025-08-27 RX ORDER — TRETINOIN 0.5 MG/G
CREAM TOPICAL
Qty: 45 G | Refills: 11 | Status: SHIPPED | OUTPATIENT
Start: 2025-08-27

## 2025-08-27 ASSESSMENT — PAIN SCALES - GENERAL: PAINLEVEL_OUTOF10: NO PAIN (0)

## 2025-08-28 LAB
FACT VIII ACT/NOR PPP: 106 % (ref 55–200)
LOCATION OF TASK: NORMAL
VON WILLEBRAND EVAL PPP-IMP: NORMAL
VWF AG ACT/NOR PPP IA: 66 % (ref 50–200)
VWF:AC ACT/NOR PPP IA: 55 % (ref 50–180)

## 2025-09-02 ENCOUNTER — TELEPHONE (OUTPATIENT)
Dept: DERMATOLOGY | Facility: CLINIC | Age: 30
End: 2025-09-02

## (undated) RX ORDER — LIDOCAINE HYDROCHLORIDE 10 MG/ML
INJECTION, SOLUTION INFILTRATION; PERINEURAL
Status: DISPENSED
Start: 2024-12-11